# Patient Record
Sex: FEMALE | Race: WHITE | Employment: FULL TIME | URBAN - METROPOLITAN AREA
[De-identification: names, ages, dates, MRNs, and addresses within clinical notes are randomized per-mention and may not be internally consistent; named-entity substitution may affect disease eponyms.]

---

## 2019-05-13 PROBLEM — E66.01 SEVERE OBESITY (HCC): Status: ACTIVE | Noted: 2019-05-13

## 2019-07-02 ENCOUNTER — HOSPITAL ENCOUNTER (OUTPATIENT)
Dept: MAMMOGRAPHY | Age: 54
Discharge: HOME | End: 2019-07-02
Attending: NURSE PRACTITIONER

## 2019-07-02 DIAGNOSIS — Z12.39 BREAST SCREENING, UNSPECIFIED: ICD-10-CM

## 2020-12-22 ENCOUNTER — HOSPITAL ENCOUNTER (OUTPATIENT)
Dept: MAMMOGRAPHY | Age: 55
Discharge: HOME | End: 2020-12-22
Attending: NURSE PRACTITIONER
Payer: COMMERCIAL

## 2020-12-22 DIAGNOSIS — Z01.419 WELL WOMAN EXAM: ICD-10-CM

## 2020-12-22 DIAGNOSIS — Z12.31 SCREENING MAMMOGRAM, ENCOUNTER FOR: ICD-10-CM

## 2020-12-22 PROCEDURE — 77067 SCR MAMMO BI INCL CAD: CPT

## 2021-01-26 ENCOUNTER — HOSPITAL ENCOUNTER (OUTPATIENT)
Dept: CT IMAGING | Age: 56
Discharge: HOME | End: 2021-01-26
Attending: UROLOGY
Payer: COMMERCIAL

## 2021-01-26 DIAGNOSIS — R31.29 MICROHEMATURIA: ICD-10-CM

## 2021-01-26 DIAGNOSIS — R31.0 GROSS HEMATURIA: ICD-10-CM

## 2021-01-26 LAB — CREAT BLD-MCNC: 1 MG/DL (ref 0.8–1.5)

## 2021-01-26 PROCEDURE — 74178 CT ABD&PLV WO CNTR FLWD CNTR: CPT

## 2021-01-26 PROCEDURE — 82565 ASSAY OF CREATININE: CPT

## 2021-01-26 PROCEDURE — 74011000636 HC RX REV CODE- 636: Performed by: UROLOGY

## 2021-01-26 PROCEDURE — 74011000258 HC RX REV CODE- 258: Performed by: UROLOGY

## 2021-01-26 RX ORDER — SODIUM CHLORIDE 0.9 % (FLUSH) 0.9 %
10 SYRINGE (ML) INJECTION
Status: COMPLETED | OUTPATIENT
Start: 2021-01-26 | End: 2021-01-26

## 2021-01-26 RX ADMIN — SODIUM CHLORIDE 100 ML: 900 INJECTION, SOLUTION INTRAVENOUS at 14:07

## 2021-01-26 RX ADMIN — IOPAMIDOL 100 ML: 755 INJECTION, SOLUTION INTRAVENOUS at 14:07

## 2021-01-26 RX ADMIN — Medication 10 ML: at 14:07

## 2021-02-02 ENCOUNTER — HOSPITAL ENCOUNTER (OUTPATIENT)
Dept: GENERAL RADIOLOGY | Age: 56
Discharge: HOME | End: 2021-02-02
Payer: COMMERCIAL

## 2021-02-02 DIAGNOSIS — N28.89 RENAL MASS: ICD-10-CM

## 2021-02-02 PROCEDURE — 71046 X-RAY EXAM CHEST 2 VIEWS: CPT

## 2021-02-17 ENCOUNTER — HOSPITAL ENCOUNTER (OUTPATIENT)
Dept: SURGERY | Age: 56
Discharge: HOME | End: 2021-02-17
Payer: COMMERCIAL

## 2021-02-17 VITALS
RESPIRATION RATE: 17 BRPM | DIASTOLIC BLOOD PRESSURE: 97 MMHG | OXYGEN SATURATION: 98 % | SYSTOLIC BLOOD PRESSURE: 166 MMHG | BODY MASS INDEX: 34.5 KG/M2 | HEART RATE: 77 BPM | HEIGHT: 63 IN | TEMPERATURE: 97.1 F | WEIGHT: 194.7 LBS

## 2021-02-17 LAB
ANION GAP SERPL CALC-SCNC: 4 MMOL/L (ref 7–16)
APPEARANCE UR: CLEAR
BACTERIA URNS QL MICRO: 0 /HPF
BILIRUB UR QL: NEGATIVE
BUN SERPL-MCNC: 14 MG/DL (ref 6–23)
CALCIUM SERPL-MCNC: 9.1 MG/DL (ref 8.3–10.4)
CASTS URNS QL MICRO: ABNORMAL /LPF
CHLORIDE SERPL-SCNC: 105 MMOL/L (ref 98–107)
CO2 SERPL-SCNC: 30 MMOL/L (ref 21–32)
COLOR UR: YELLOW
CREAT SERPL-MCNC: 0.73 MG/DL (ref 0.6–1)
EPI CELLS #/AREA URNS HPF: ABNORMAL /HPF
ERYTHROCYTE [DISTWIDTH] IN BLOOD BY AUTOMATED COUNT: 13 % (ref 11.9–14.6)
GLUCOSE SERPL-MCNC: 102 MG/DL (ref 65–100)
GLUCOSE UR STRIP.AUTO-MCNC: NEGATIVE MG/DL
HCT VFR BLD AUTO: 49.5 % (ref 35.8–46.3)
HGB BLD-MCNC: 16.3 G/DL (ref 11.7–15.4)
HGB UR QL STRIP: ABNORMAL
KETONES UR QL STRIP.AUTO: NEGATIVE MG/DL
LEUKOCYTE ESTERASE UR QL STRIP.AUTO: ABNORMAL
MCH RBC QN AUTO: 29.1 PG (ref 26.1–32.9)
MCHC RBC AUTO-ENTMCNC: 32.9 G/DL (ref 31.4–35)
MCV RBC AUTO: 88.2 FL (ref 79.6–97.8)
NITRITE UR QL STRIP.AUTO: NEGATIVE
NRBC # BLD: 0 K/UL (ref 0–0.2)
PH UR STRIP: 6.5 [PH] (ref 5–9)
PLATELET # BLD AUTO: 216 K/UL (ref 150–450)
PMV BLD AUTO: 11.4 FL (ref 9.4–12.3)
POTASSIUM SERPL-SCNC: 4.2 MMOL/L (ref 3.5–5.1)
PROT UR STRIP-MCNC: NEGATIVE MG/DL
RBC # BLD AUTO: 5.61 M/UL (ref 4.05–5.2)
RBC #/AREA URNS HPF: ABNORMAL /HPF
SODIUM SERPL-SCNC: 139 MMOL/L (ref 136–145)
SP GR UR REFRACTOMETRY: 1.01 (ref 1–1.02)
UROBILINOGEN UR QL STRIP.AUTO: 1 EU/DL (ref 0.2–1)
WBC # BLD AUTO: 7.4 K/UL (ref 4.3–11.1)
WBC URNS QL MICRO: ABNORMAL /HPF

## 2021-02-17 PROCEDURE — 80048 BASIC METABOLIC PNL TOTAL CA: CPT

## 2021-02-17 PROCEDURE — 93005 ELECTROCARDIOGRAM TRACING: CPT | Performed by: ANESTHESIOLOGY

## 2021-02-17 PROCEDURE — 85027 COMPLETE CBC AUTOMATED: CPT

## 2021-02-17 PROCEDURE — 81001 URINALYSIS AUTO W/SCOPE: CPT

## 2021-02-17 RX ORDER — LORATADINE 10 MG/1
10 TABLET ORAL AS NEEDED
COMMUNITY

## 2021-02-17 NOTE — PERIOP NOTES
Recent Results (from the past 12 hour(s))   URINALYSIS W/ RFLX MICROSCOPIC    Collection Time: 02/17/21 10:42 AM   Result Value Ref Range    Color YELLOW      Appearance CLEAR      Specific gravity 1.015 1.001 - 1.023      pH (UA) 6.5 5.0 - 9.0      Protein Negative NEG mg/dL    Glucose Negative mg/dL    Ketone Negative NEG mg/dL    Bilirubin Negative NEG      Blood SMALL (A) NEG      Urobilinogen 1.0 0.2 - 1.0 EU/dL    Nitrites Negative NEG      Leukocyte Esterase SMALL (A) NEG      WBC 5-10 0 /hpf    RBC 3-5 0 /hpf    Epithelial cells 0-3 0 /hpf    Bacteria 0 0 /hpf    Casts 0-3 0 /lpf   CBC W/O DIFF    Collection Time: 02/17/21 10:47 AM   Result Value Ref Range    WBC 7.4 4.3 - 11.1 K/uL    RBC 5.61 (H) 4.05 - 5.2 M/uL    HGB 16.3 (H) 11.7 - 15.4 g/dL    HCT 49.5 (H) 35.8 - 46.3 %    MCV 88.2 79.6 - 97.8 FL    MCH 29.1 26.1 - 32.9 PG    MCHC 32.9 31.4 - 35.0 g/dL    RDW 13.0 11.9 - 14.6 %    PLATELET 551 283 - 150 K/uL    MPV 11.4 9.4 - 12.3 FL    ABSOLUTE NRBC 0.00 0.0 - 0.2 K/uL   METABOLIC PANEL, BASIC    Collection Time: 02/17/21 10:47 AM   Result Value Ref Range    Sodium 139 136 - 145 mmol/L    Potassium 4.2 3.5 - 5.1 mmol/L    Chloride 105 98 - 107 mmol/L    CO2 30 21 - 32 mmol/L    Anion gap 4 (L) 7 - 16 mmol/L    Glucose 102 (H) 65 - 100 mg/dL    BUN 14 6 - 23 MG/DL    Creatinine 0.73 0.6 - 1.0 MG/DL    GFR est AA >60 >60 ml/min/1.73m2    GFR est non-AA >60 >60 ml/min/1.73m2    Calcium 9.1 8.3 - 10.4 MG/DL   EKG, 12 LEAD, INITIAL    Collection Time: 02/17/21 11:51 AM   Result Value Ref Range    Ventricular Rate 78 BPM    Atrial Rate 78 BPM    P-R Interval 150 ms    QRS Duration 88 ms    Q-T Interval 362 ms    QTC Calculation (Bezet) 412 ms    Calculated P Axis 70 degrees    Calculated R Axis 83 degrees    Calculated T Axis 81 degrees    Diagnosis       Normal sinus rhythm  Normal ECG  No previous ECGs available       Labs reviewed. UA results reported to [facility] at surgeon's office.  Results routed to surgeon.

## 2021-02-17 NOTE — PERIOP NOTES
PLEASE CONTINUE TAKING ALL PRESCRIPTION MEDICATIONS UP TO THE DAY OF SURGERY UNLESS OTHERWISE DIRECTED BELOW. DISCONTINUE all vitamins and supplements 7 days prior to surgery. DISCONTINUE Non-Steriodal Anti-Inflammatory (NSAIDS) such as Advil and Aleve 5 days prior to surgery. Home Medications to take  the day of surgery   Take:   Loratadine (Claritin) if needed  Flonase if needed               Home Medications   to Hold   Meloxicam (Mobic) 5 days          Comments   1 person may accompany you into the hospital day of procedure. Visiting hours 10 am- 6 pm. 1 visitor per patient per day at this time. Please do not bring home medications with you on the day of surgery unless otherwise directed by your nurse. If you are instructed to bring home medications, please give them to your nurse as they will be administered by the nursing staff. If you have any questions, please call [address] [phone]. A copy of this note was provided to the patient for reference.

## 2021-02-17 NOTE — PERIOP NOTES
Patient confirms name and . Order to obtain consent  found in EHR and  matches case posting. Pt verbalizes understanding of 1 visitor policy. Type 3 surgery, walk-in PAT assessment complete. Labs per surgeon: CBC, BMP, UA today  T&S s/h for DOS  Labs per anesthesia protocol: EKG   EKG: completed and WDL of anesthesia protocol  Glucose: not indicated    Covid test 21. Result pending. ERAS coordinator met with patient at PAT appointment and provided education and handbook. Pre-surgery drinks with instructions provided to patient. Rx bottles visualized today. As instructed per order in EHR, called surgeon's office notify that patient does not take a beta blocker. Spoke with Mike Zhu. Antibacterial soap and Hibiclens and instructions given per hospital policy. Use in shower the night before surgery and on the morning of surgery. Patient provided with and instructed on educational handouts including Guide to Surgery, Pain Management, Hand Hygiene, Blood Transfusion Education, and [facility] Brochure. Medication instructions provided per PTA medication instruction note. Patient provided with a copy. Patient answered medical/surgical history questions at their best of ability. All prior to admission medications documented in [facility]. Patient instructed on the following:  Ar[facility] at [facility], time of arrival to be called the day before by 1700  NPO after midnight including gum, mints, and ice chips. Responsible adult must drive patient to the hospital, stay during surgery, and patient will require supervision 24 hours after anesthesia. If admitted to hospital, current visiting policy is 1 visitor per patient per day from 10 am to 6 pm.  Leave all valuables (money and jewelry) at home but bring insurance card and ID on DOS. Do not wear make-up, nail polish, lotions, cologne, perfumes, powders, or oil on skin.     Patient teach back successful and patient demonstrates knowledge of instruction.

## 2021-02-17 NOTE — PERIOP NOTES
Enhanced Recovery After Surgery: non-diabetic patients    Drink Ensure Enlive - one bottle twice daily for five days starting on 2/17/21 and stopping on 2/21/21. Do not drink any Ensure Enlive the day before surgery 2/22/21. Ensure Enlive is the preferred formula over other Ensure formulas as it is the only one that contains CaHMB which helps maintain and rebuild muscle health. It is recommended that you continue drinking this for one month after surgery. The night before surgery 2/22/21, drink 2 bottles of the Ensure Pre-Surgery drink. The morning of surgery 2/23/21, drink one bottle of the Ensure Pre-Surgery drink while on your way to the hospital. Drink this over 5-10 minutes. Drink nothing else after drinking the pre-surgical drink the morning of surgery. Bring your patient handbook with you to the hospital.      Things to remember:    1. You will be up on a chair the evening of surgery and drinking clear liquids. Your diet will be advanced by your surgeon as appropriate. 2. Beginning the day after surgery, you will be up in a chair for all meals. 3. Beginning the day after surgery, you will be out of the bed for a minimum of 6 hours (not all at one time)    4. Beginning the day after surgery, you will walk in the gusman in the gusman at least 50' at least three times a day. 5. You will be given scheduled non-narcotic pain medication to help keep your pain under control. You will have stronger pain medication ordered for break through pain. 6. All of these measures are geared toward returning your bowel to normal function as soon as possible and to prevent complications associated with bowel blockage, blood clots, and/or pneumonia.

## 2021-02-18 LAB
ATRIAL RATE: 78 BPM
CALCULATED P AXIS, ECG09: 70 DEGREES
CALCULATED R AXIS, ECG10: 83 DEGREES
CALCULATED T AXIS, ECG11: 81 DEGREES
DIAGNOSIS, 93000: NORMAL
P-R INTERVAL, ECG05: 150 MS
Q-T INTERVAL, ECG07: 362 MS
QRS DURATION, ECG06: 88 MS
QTC CALCULATION (BEZET), ECG08: 412 MS
VENTRICULAR RATE, ECG03: 78 BPM

## 2021-02-22 ENCOUNTER — ANESTHESIA EVENT (OUTPATIENT)
Dept: SURGERY | Age: 56
DRG: 658 | End: 2021-02-22
Payer: COMMERCIAL

## 2021-02-23 ENCOUNTER — ANESTHESIA (OUTPATIENT)
Dept: SURGERY | Age: 56
DRG: 658 | End: 2021-02-23
Payer: COMMERCIAL

## 2021-02-23 ENCOUNTER — HOSPITAL ENCOUNTER (INPATIENT)
Age: 56
LOS: 4 days | Discharge: HOME | DRG: 658 | End: 2021-02-27
Attending: UROLOGY | Admitting: UROLOGY
Payer: COMMERCIAL

## 2021-02-23 DIAGNOSIS — N28.89 RENAL MASS, LEFT: ICD-10-CM

## 2021-02-23 DIAGNOSIS — Z90.5 S/P NEPHRECTOMY: ICD-10-CM

## 2021-02-23 LAB
ABO + RH BLD: NORMAL
BLOOD GROUP ANTIBODIES SERPL: NORMAL
HCT VFR BLD AUTO: 46.5 % (ref 35.8–46.3)
HGB BLD-MCNC: 14.8 G/DL (ref 11.7–15.4)
SPECIMEN EXP DATE BLD: NORMAL

## 2021-02-23 PROCEDURE — 74011000258 HC RX REV CODE- 258: Performed by: UROLOGY

## 2021-02-23 PROCEDURE — 74011250636 HC RX REV CODE- 250/636: Performed by: STUDENT IN AN ORGANIZED HEALTH CARE EDUCATION/TRAINING PROGRAM

## 2021-02-23 PROCEDURE — 77030008542 HC TBNG MON PRSS EDWD -A: Performed by: STUDENT IN AN ORGANIZED HEALTH CARE EDUCATION/TRAINING PROGRAM

## 2021-02-23 PROCEDURE — 77030037088 HC TUBE ENDOTRACH ORAL NSL COVD-A: Performed by: STUDENT IN AN ORGANIZED HEALTH CARE EDUCATION/TRAINING PROGRAM

## 2021-02-23 PROCEDURE — 74011000250 HC RX REV CODE- 250: Performed by: NURSE ANESTHETIST, CERTIFIED REGISTERED

## 2021-02-23 PROCEDURE — 88307 TISSUE EXAM BY PATHOLOGIST: CPT

## 2021-02-23 PROCEDURE — 77030005401 HC CATH RAD ARRO -A: Performed by: STUDENT IN AN ORGANIZED HEALTH CARE EDUCATION/TRAINING PROGRAM

## 2021-02-23 PROCEDURE — 77030039425 HC BLD LARYNG TRULITE DISP TELE -A: Performed by: STUDENT IN AN ORGANIZED HEALTH CARE EDUCATION/TRAINING PROGRAM

## 2021-02-23 PROCEDURE — 50230 REMOVAL KIDNEY OPEN RADICAL: CPT | Performed by: UROLOGY

## 2021-02-23 PROCEDURE — 77030011264 HC ELECTRD BLD EXT COVD -A: Performed by: UROLOGY

## 2021-02-23 PROCEDURE — 0GB20ZZ EXCISION OF LEFT ADRENAL GLAND, OPEN APPROACH: ICD-10-PCS | Performed by: UROLOGY

## 2021-02-23 PROCEDURE — 85018 HEMOGLOBIN: CPT

## 2021-02-23 PROCEDURE — 76210000017 HC OR PH I REC 1.5 TO 2 HR: Performed by: UROLOGY

## 2021-02-23 PROCEDURE — 65270000029 HC RM PRIVATE

## 2021-02-23 PROCEDURE — 77030034698 HC LIGASURE MRYLND OPN SEAL DIV COVD -F: Performed by: UROLOGY

## 2021-02-23 PROCEDURE — 74011250636 HC RX REV CODE- 250/636: Performed by: NURSE ANESTHETIST, CERTIFIED REGISTERED

## 2021-02-23 PROCEDURE — 77030040830 HC CATH URETH FOL MDII -A: Performed by: UROLOGY

## 2021-02-23 PROCEDURE — 2709999900 HC NON-CHARGEABLE SUPPLY: Performed by: UROLOGY

## 2021-02-23 PROCEDURE — 77030040361 HC SLV COMPR DVT MDII -B: Performed by: UROLOGY

## 2021-02-23 PROCEDURE — 77030008462 HC STPLR SKN PROX J&J -A: Performed by: UROLOGY

## 2021-02-23 PROCEDURE — 74011250636 HC RX REV CODE- 250/636: Performed by: UROLOGY

## 2021-02-23 PROCEDURE — 74011250637 HC RX REV CODE- 250/637: Performed by: UROLOGY

## 2021-02-23 PROCEDURE — 76010000174 HC OR TIME 3.5 TO 4 HR INTENSV-TIER 1: Performed by: UROLOGY

## 2021-02-23 PROCEDURE — 0TT10ZZ RESECTION OF LEFT KIDNEY, OPEN APPROACH: ICD-10-PCS | Performed by: UROLOGY

## 2021-02-23 PROCEDURE — 77030002996 HC SUT SLK J&J -A: Performed by: UROLOGY

## 2021-02-23 PROCEDURE — 74011250637 HC RX REV CODE- 250/637: Performed by: STUDENT IN AN ORGANIZED HEALTH CARE EDUCATION/TRAINING PROGRAM

## 2021-02-23 PROCEDURE — 76060000038 HC ANESTHESIA 3.5 TO 4 HR: Performed by: UROLOGY

## 2021-02-23 PROCEDURE — 86901 BLOOD TYPING SEROLOGIC RH(D): CPT

## 2021-02-23 PROCEDURE — 77030040922 HC BLNKT HYPOTHRM STRY -A: Performed by: STUDENT IN AN ORGANIZED HEALTH CARE EDUCATION/TRAINING PROGRAM

## 2021-02-23 PROCEDURE — 2709999900 HC NON-CHARGEABLE SUPPLY

## 2021-02-23 PROCEDURE — 77030002966 HC SUT PDS J&J -A: Performed by: UROLOGY

## 2021-02-23 PROCEDURE — 77030027138 HC INCENT SPIROMETER -A

## 2021-02-23 PROCEDURE — 77030013794 HC KT TRNSDUC BLD EDWD -B: Performed by: STUDENT IN AN ORGANIZED HEALTH CARE EDUCATION/TRAINING PROGRAM

## 2021-02-23 PROCEDURE — 77030019908 HC STETH ESOPH SIMS -A: Performed by: STUDENT IN AN ORGANIZED HEALTH CARE EDUCATION/TRAINING PROGRAM

## 2021-02-23 RX ORDER — CELECOXIB 100 MG/1
100 CAPSULE ORAL 2 TIMES DAILY
Status: DISCONTINUED | OUTPATIENT
Start: 2021-02-24 | End: 2021-02-23

## 2021-02-23 RX ORDER — ROCURONIUM BROMIDE 10 MG/ML
INJECTION, SOLUTION INTRAVENOUS AS NEEDED
Status: DISCONTINUED | OUTPATIENT
Start: 2021-02-23 | End: 2021-02-23 | Stop reason: HOSPADM

## 2021-02-23 RX ORDER — ONDANSETRON 8 MG/1
4 TABLET, ORALLY DISINTEGRATING ORAL
Status: DISCONTINUED | OUTPATIENT
Start: 2021-02-23 | End: 2021-02-27 | Stop reason: HOSPADM

## 2021-02-23 RX ORDER — HYDROMORPHONE HYDROCHLORIDE 2 MG/ML
0.5 INJECTION, SOLUTION INTRAMUSCULAR; INTRAVENOUS; SUBCUTANEOUS
Status: COMPLETED | OUTPATIENT
Start: 2021-02-23 | End: 2021-02-23

## 2021-02-23 RX ORDER — DEXAMETHASONE SODIUM PHOSPHATE 4 MG/ML
INJECTION, SOLUTION INTRA-ARTICULAR; INTRALESIONAL; INTRAMUSCULAR; INTRAVENOUS; SOFT TISSUE AS NEEDED
Status: DISCONTINUED | OUTPATIENT
Start: 2021-02-23 | End: 2021-02-23 | Stop reason: HOSPADM

## 2021-02-23 RX ORDER — LIDOCAINE HYDROCHLORIDE 20 MG/ML
INJECTION, SOLUTION EPIDURAL; INFILTRATION; INTRACAUDAL; PERINEURAL AS NEEDED
Status: DISCONTINUED | OUTPATIENT
Start: 2021-02-23 | End: 2021-02-23 | Stop reason: HOSPADM

## 2021-02-23 RX ORDER — FLUMAZENIL 0.1 MG/ML
0.2 INJECTION INTRAVENOUS
Status: DISCONTINUED | OUTPATIENT
Start: 2021-02-23 | End: 2021-02-23 | Stop reason: HOSPADM

## 2021-02-23 RX ORDER — NALOXONE HYDROCHLORIDE 0.4 MG/ML
0.1 INJECTION, SOLUTION INTRAMUSCULAR; INTRAVENOUS; SUBCUTANEOUS AS NEEDED
Status: DISCONTINUED | OUTPATIENT
Start: 2021-02-23 | End: 2021-02-23 | Stop reason: HOSPADM

## 2021-02-23 RX ORDER — SODIUM CHLORIDE, SODIUM LACTATE, POTASSIUM CHLORIDE, CALCIUM CHLORIDE 600; 310; 30; 20 MG/100ML; MG/100ML; MG/100ML; MG/100ML
100 INJECTION, SOLUTION INTRAVENOUS CONTINUOUS
Status: DISCONTINUED | OUTPATIENT
Start: 2021-02-23 | End: 2021-02-23 | Stop reason: HOSPADM

## 2021-02-23 RX ORDER — ACETAMINOPHEN 500 MG
1000 TABLET ORAL ONCE
Status: DISCONTINUED | OUTPATIENT
Start: 2021-02-23 | End: 2021-02-23 | Stop reason: HOSPADM

## 2021-02-23 RX ORDER — SODIUM CHLORIDE 0.9 % (FLUSH) 0.9 %
5-40 SYRINGE (ML) INJECTION EVERY 8 HOURS
Status: DISCONTINUED | OUTPATIENT
Start: 2021-02-23 | End: 2021-02-23 | Stop reason: HOSPADM

## 2021-02-23 RX ORDER — SODIUM CHLORIDE, SODIUM LACTATE, POTASSIUM CHLORIDE, CALCIUM CHLORIDE 600; 310; 30; 20 MG/100ML; MG/100ML; MG/100ML; MG/100ML
25 INJECTION, SOLUTION INTRAVENOUS CONTINUOUS
Status: DISCONTINUED | OUTPATIENT
Start: 2021-02-23 | End: 2021-02-26

## 2021-02-23 RX ORDER — DIPHENHYDRAMINE HYDROCHLORIDE 50 MG/ML
12.5 INJECTION, SOLUTION INTRAMUSCULAR; INTRAVENOUS
Status: DISCONTINUED | OUTPATIENT
Start: 2021-02-23 | End: 2021-02-23 | Stop reason: HOSPADM

## 2021-02-23 RX ORDER — APREPITANT 40 MG/1
40 CAPSULE ORAL ONCE
Status: COMPLETED | OUTPATIENT
Start: 2021-02-23 | End: 2021-02-23

## 2021-02-23 RX ORDER — KETAMINE HYDROCHLORIDE 50 MG/ML
INJECTION, SOLUTION INTRAMUSCULAR; INTRAVENOUS AS NEEDED
Status: DISCONTINUED | OUTPATIENT
Start: 2021-02-23 | End: 2021-02-23 | Stop reason: HOSPADM

## 2021-02-23 RX ORDER — SODIUM CHLORIDE, SODIUM LACTATE, POTASSIUM CHLORIDE, CALCIUM CHLORIDE 600; 310; 30; 20 MG/100ML; MG/100ML; MG/100ML; MG/100ML
INJECTION, SOLUTION INTRAVENOUS
Status: DISCONTINUED | OUTPATIENT
Start: 2021-02-23 | End: 2021-02-23 | Stop reason: HOSPADM

## 2021-02-23 RX ORDER — HYDROMORPHONE HYDROCHLORIDE 1 MG/ML
1 INJECTION, SOLUTION INTRAMUSCULAR; INTRAVENOUS; SUBCUTANEOUS
Status: DISCONTINUED | OUTPATIENT
Start: 2021-02-23 | End: 2021-02-25

## 2021-02-23 RX ORDER — GABAPENTIN 300 MG/1
300 CAPSULE ORAL 3 TIMES DAILY
Status: DISCONTINUED | OUTPATIENT
Start: 2021-02-23 | End: 2021-02-27 | Stop reason: HOSPADM

## 2021-02-23 RX ORDER — NALOXONE HYDROCHLORIDE 0.4 MG/ML
0.4 INJECTION, SOLUTION INTRAMUSCULAR; INTRAVENOUS; SUBCUTANEOUS AS NEEDED
Status: DISCONTINUED | OUTPATIENT
Start: 2021-02-23 | End: 2021-02-27 | Stop reason: HOSPADM

## 2021-02-23 RX ORDER — LABETALOL HYDROCHLORIDE 5 MG/ML
INJECTION, SOLUTION INTRAVENOUS AS NEEDED
Status: DISCONTINUED | OUTPATIENT
Start: 2021-02-23 | End: 2021-02-23 | Stop reason: HOSPADM

## 2021-02-23 RX ORDER — EPHEDRINE SULFATE/0.9% NACL/PF 50 MG/5 ML
SYRINGE (ML) INTRAVENOUS AS NEEDED
Status: DISCONTINUED | OUTPATIENT
Start: 2021-02-23 | End: 2021-02-23 | Stop reason: HOSPADM

## 2021-02-23 RX ORDER — ACETAMINOPHEN 500 MG
1000 TABLET ORAL EVERY 6 HOURS
Status: DISCONTINUED | OUTPATIENT
Start: 2021-02-23 | End: 2021-02-27 | Stop reason: HOSPADM

## 2021-02-23 RX ORDER — ESMOLOL HYDROCHLORIDE 10 MG/ML
INJECTION INTRAVENOUS AS NEEDED
Status: DISCONTINUED | OUTPATIENT
Start: 2021-02-23 | End: 2021-02-23 | Stop reason: HOSPADM

## 2021-02-23 RX ORDER — CEFAZOLIN SODIUM/WATER 2 G/20 ML
2 SYRINGE (ML) INTRAVENOUS ONCE
Status: COMPLETED | OUTPATIENT
Start: 2021-02-23 | End: 2021-02-23

## 2021-02-23 RX ORDER — FENTANYL CITRATE 50 UG/ML
INJECTION, SOLUTION INTRAMUSCULAR; INTRAVENOUS AS NEEDED
Status: DISCONTINUED | OUTPATIENT
Start: 2021-02-23 | End: 2021-02-23 | Stop reason: HOSPADM

## 2021-02-23 RX ORDER — SODIUM CHLORIDE 0.9 % (FLUSH) 0.9 %
5-40 SYRINGE (ML) INJECTION AS NEEDED
Status: DISCONTINUED | OUTPATIENT
Start: 2021-02-23 | End: 2021-02-23 | Stop reason: HOSPADM

## 2021-02-23 RX ORDER — LIDOCAINE HYDROCHLORIDE ANHYDROUS AND DEXTROSE MONOHYDRATE .4; 5 G/100ML; G/100ML
1 INJECTION, SOLUTION INTRAVENOUS CONTINUOUS
Status: ACTIVE | OUTPATIENT
Start: 2021-02-23 | End: 2021-02-24

## 2021-02-23 RX ORDER — VECURONIUM BROMIDE FOR INJECTION 1 MG/ML
INJECTION, POWDER, LYOPHILIZED, FOR SOLUTION INTRAVENOUS AS NEEDED
Status: DISCONTINUED | OUTPATIENT
Start: 2021-02-23 | End: 2021-02-23 | Stop reason: HOSPADM

## 2021-02-23 RX ORDER — DIPHENHYDRAMINE HCL 25 MG
25 CAPSULE ORAL
Status: DISCONTINUED | OUTPATIENT
Start: 2021-02-23 | End: 2021-02-27 | Stop reason: HOSPADM

## 2021-02-23 RX ORDER — MIDAZOLAM HYDROCHLORIDE 1 MG/ML
2 INJECTION, SOLUTION INTRAMUSCULAR; INTRAVENOUS
Status: COMPLETED | OUTPATIENT
Start: 2021-02-23 | End: 2021-02-23

## 2021-02-23 RX ORDER — ESTERIFIED ESTROGEN AND METHYLTESTOSTERONE 1.25; 2.5 MG/1; MG/1
2 TABLET ORAL DAILY
Status: DISCONTINUED | OUTPATIENT
Start: 2021-02-24 | End: 2021-02-27 | Stop reason: HOSPADM

## 2021-02-23 RX ORDER — BUPROPION HYDROCHLORIDE 150 MG/1
150 TABLET, EXTENDED RELEASE ORAL DAILY
Status: DISCONTINUED | OUTPATIENT
Start: 2021-02-24 | End: 2021-02-27 | Stop reason: HOSPADM

## 2021-02-23 RX ORDER — OXYCODONE HYDROCHLORIDE 5 MG/1
5 TABLET ORAL
Status: DISCONTINUED | OUTPATIENT
Start: 2021-02-23 | End: 2021-02-23 | Stop reason: HOSPADM

## 2021-02-23 RX ORDER — GABAPENTIN 300 MG/1
300 CAPSULE ORAL ONCE
Status: COMPLETED | OUTPATIENT
Start: 2021-02-23 | End: 2021-02-23

## 2021-02-23 RX ORDER — CELECOXIB 100 MG/1
100 CAPSULE ORAL 2 TIMES DAILY
Status: DISCONTINUED | OUTPATIENT
Start: 2021-02-24 | End: 2021-02-24

## 2021-02-23 RX ORDER — OXYCODONE HYDROCHLORIDE 5 MG/1
5 TABLET ORAL
Status: DISCONTINUED | OUTPATIENT
Start: 2021-02-23 | End: 2021-02-27 | Stop reason: HOSPADM

## 2021-02-23 RX ORDER — ONDANSETRON 2 MG/ML
INJECTION INTRAMUSCULAR; INTRAVENOUS AS NEEDED
Status: DISCONTINUED | OUTPATIENT
Start: 2021-02-23 | End: 2021-02-23 | Stop reason: HOSPADM

## 2021-02-23 RX ORDER — LIDOCAINE HYDROCHLORIDE 10 MG/ML
0.1 INJECTION INFILTRATION; PERINEURAL AS NEEDED
Status: DISCONTINUED | OUTPATIENT
Start: 2021-02-23 | End: 2021-02-23 | Stop reason: HOSPADM

## 2021-02-23 RX ORDER — KETOROLAC TROMETHAMINE 30 MG/ML
15 INJECTION, SOLUTION INTRAMUSCULAR; INTRAVENOUS EVERY 6 HOURS
Status: DISCONTINUED | OUTPATIENT
Start: 2021-02-23 | End: 2021-02-24

## 2021-02-23 RX ORDER — LISINOPRIL 5 MG/1
10 TABLET ORAL
Status: DISCONTINUED | OUTPATIENT
Start: 2021-02-23 | End: 2021-02-27 | Stop reason: HOSPADM

## 2021-02-23 RX ADMIN — HYDROMORPHONE HYDROCHLORIDE 0.5 MG: 2 INJECTION INTRAMUSCULAR; INTRAVENOUS; SUBCUTANEOUS at 14:15

## 2021-02-23 RX ADMIN — HYDROMORPHONE HYDROCHLORIDE 0.5 MG: 2 INJECTION INTRAMUSCULAR; INTRAVENOUS; SUBCUTANEOUS at 14:45

## 2021-02-23 RX ADMIN — SODIUM CHLORIDE, SODIUM LACTATE, POTASSIUM CHLORIDE, AND CALCIUM CHLORIDE: 600; 310; 30; 20 INJECTION, SOLUTION INTRAVENOUS at 11:28

## 2021-02-23 RX ADMIN — CEFAZOLIN 2 G: 1 INJECTION, POWDER, FOR SOLUTION INTRAVENOUS at 10:57

## 2021-02-23 RX ADMIN — ROCURONIUM BROMIDE 50 MG: 10 INJECTION, SOLUTION INTRAVENOUS at 10:13

## 2021-02-23 RX ADMIN — ACETAMINOPHEN 1000 MG: 500 TABLET ORAL at 17:16

## 2021-02-23 RX ADMIN — ESMOLOL HYDROCHLORIDE 30 MG: 10 INJECTION, SOLUTION INTRAVENOUS at 13:35

## 2021-02-23 RX ADMIN — GABAPENTIN 300 MG: 300 CAPSULE ORAL at 17:17

## 2021-02-23 RX ADMIN — FENTANYL CITRATE 50 MCG: 50 INJECTION INTRAMUSCULAR; INTRAVENOUS at 12:45

## 2021-02-23 RX ADMIN — NALOXEGOL OXALATE 12.5 MG: 12.5 TABLET, FILM COATED ORAL at 10:00

## 2021-02-23 RX ADMIN — APREPITANT 40 MG: 40 CAPSULE ORAL at 08:11

## 2021-02-23 RX ADMIN — HYDROMORPHONE HYDROCHLORIDE 0.5 MG: 2 INJECTION INTRAMUSCULAR; INTRAVENOUS; SUBCUTANEOUS at 14:25

## 2021-02-23 RX ADMIN — OXYCODONE 5 MG: 5 TABLET ORAL at 21:54

## 2021-02-23 RX ADMIN — Medication 10 MG: at 10:59

## 2021-02-23 RX ADMIN — PHENYLEPHRINE HYDROCHLORIDE 150 MCG: 10 INJECTION INTRAVENOUS at 10:22

## 2021-02-23 RX ADMIN — PHENYLEPHRINE HYDROCHLORIDE 200 MCG: 10 INJECTION INTRAVENOUS at 10:40

## 2021-02-23 RX ADMIN — GABAPENTIN 300 MG: 300 CAPSULE ORAL at 08:10

## 2021-02-23 RX ADMIN — VECURONIUM BROMIDE 1 MG: 1 INJECTION, POWDER, LYOPHILIZED, FOR SOLUTION INTRAVENOUS at 12:50

## 2021-02-23 RX ADMIN — LABETALOL HYDROCHLORIDE 5 MG: 5 INJECTION INTRAVENOUS at 12:59

## 2021-02-23 RX ADMIN — GABAPENTIN 300 MG: 300 CAPSULE ORAL at 21:47

## 2021-02-23 RX ADMIN — SODIUM CHLORIDE, SODIUM LACTATE, POTASSIUM CHLORIDE, AND CALCIUM CHLORIDE 100 ML/HR: 600; 310; 30; 20 INJECTION, SOLUTION INTRAVENOUS at 08:10

## 2021-02-23 RX ADMIN — LIDOCAINE HYDROCHLORIDE 1 MG/KG/HR: 4 INJECTION, SOLUTION INTRAVENOUS at 10:20

## 2021-02-23 RX ADMIN — VECURONIUM BROMIDE 2 MG: 1 INJECTION, POWDER, LYOPHILIZED, FOR SOLUTION INTRAVENOUS at 11:48

## 2021-02-23 RX ADMIN — CEFAZOLIN 1 G: 1 INJECTION, POWDER, FOR SOLUTION INTRAMUSCULAR; INTRAVENOUS at 18:40

## 2021-02-23 RX ADMIN — FENTANYL CITRATE 50 MCG: 50 INJECTION INTRAMUSCULAR; INTRAVENOUS at 11:17

## 2021-02-23 RX ADMIN — KETAMINE HYDROCHLORIDE 30 MG: 50 INJECTION INTRAMUSCULAR; INTRAVENOUS at 10:51

## 2021-02-23 RX ADMIN — PHENYLEPHRINE HYDROCHLORIDE 100 MCG: 10 INJECTION INTRAVENOUS at 10:59

## 2021-02-23 RX ADMIN — SODIUM CHLORIDE, SODIUM LACTATE, POTASSIUM CHLORIDE, AND CALCIUM CHLORIDE: 600; 310; 30; 20 INJECTION, SOLUTION INTRAVENOUS at 10:07

## 2021-02-23 RX ADMIN — MIDAZOLAM 2 MG: 1 INJECTION INTRAMUSCULAR; INTRAVENOUS at 09:27

## 2021-02-23 RX ADMIN — ONDANSETRON 4 MG: 2 INJECTION INTRAMUSCULAR; INTRAVENOUS at 13:34

## 2021-02-23 RX ADMIN — SODIUM CHLORIDE, SODIUM LACTATE, POTASSIUM CHLORIDE, AND CALCIUM CHLORIDE: 600; 310; 30; 20 INJECTION, SOLUTION INTRAVENOUS at 10:18

## 2021-02-23 RX ADMIN — FENTANYL CITRATE 50 MCG: 50 INJECTION INTRAMUSCULAR; INTRAVENOUS at 11:24

## 2021-02-23 RX ADMIN — LISINOPRIL 10 MG: 5 TABLET ORAL at 21:47

## 2021-02-23 RX ADMIN — KETAMINE HYDROCHLORIDE 15 MG: 50 INJECTION INTRAMUSCULAR; INTRAVENOUS at 11:31

## 2021-02-23 RX ADMIN — PHENYLEPHRINE HYDROCHLORIDE 100 MCG: 10 INJECTION INTRAVENOUS at 10:32

## 2021-02-23 RX ADMIN — KETOROLAC TROMETHAMINE 15 MG: 30 INJECTION, SOLUTION INTRAMUSCULAR at 23:54

## 2021-02-23 RX ADMIN — VECURONIUM BROMIDE 3 MG: 1 INJECTION, POWDER, LYOPHILIZED, FOR SOLUTION INTRAVENOUS at 11:10

## 2021-02-23 RX ADMIN — FENTANYL CITRATE 100 MCG: 50 INJECTION INTRAMUSCULAR; INTRAVENOUS at 10:13

## 2021-02-23 RX ADMIN — KETAMINE HYDROCHLORIDE 15 MG: 50 INJECTION INTRAMUSCULAR; INTRAVENOUS at 12:35

## 2021-02-23 RX ADMIN — VECURONIUM BROMIDE 2 MG: 1 INJECTION, POWDER, LYOPHILIZED, FOR SOLUTION INTRAVENOUS at 12:24

## 2021-02-23 RX ADMIN — FENTANYL CITRATE 50 MCG: 50 INJECTION INTRAMUSCULAR; INTRAVENOUS at 11:41

## 2021-02-23 RX ADMIN — KETOROLAC TROMETHAMINE 15 MG: 30 INJECTION, SOLUTION INTRAMUSCULAR at 17:17

## 2021-02-23 RX ADMIN — ACETAMINOPHEN 1000 MG: 500 TABLET ORAL at 23:54

## 2021-02-23 RX ADMIN — DEXAMETHASONE SODIUM PHOSPHATE 10 MG: 4 INJECTION, SOLUTION INTRAMUSCULAR; INTRAVENOUS at 13:34

## 2021-02-23 RX ADMIN — HYDROMORPHONE HYDROCHLORIDE 0.5 MG: 2 INJECTION INTRAMUSCULAR; INTRAVENOUS; SUBCUTANEOUS at 14:35

## 2021-02-23 RX ADMIN — SODIUM CHLORIDE, SODIUM LACTATE, POTASSIUM CHLORIDE, AND CALCIUM CHLORIDE 25 ML/HR: 600; 310; 30; 20 INJECTION, SOLUTION INTRAVENOUS at 17:17

## 2021-02-23 RX ADMIN — LIDOCAINE HYDROCHLORIDE 60 MG: 20 INJECTION, SOLUTION EPIDURAL; INFILTRATION; INTRACAUDAL; PERINEURAL at 10:13

## 2021-02-23 RX ADMIN — PHENYLEPHRINE HYDROCHLORIDE 150 MCG: 10 INJECTION INTRAVENOUS at 10:37

## 2021-02-23 NOTE — PERIOP NOTES
Lidocaine drip pump settings confirmed at [facility] body weight: 52.4 kg (115 lb 8.3 oz). Infusing at 13.1 ml/hour.

## 2021-02-23 NOTE — H&P
Catie Oates   : 1965        Chief Complaint   Patient presents with    Other     Left renal mass. KYM Ocasio is a 54 y.o. female Referred by Efren Bell NP at [facility]. for evaluation and treatment of gross hematuria. UA in  showed tr blood, 1+ uriel by dipstick. Negative urine culture   Pt has noted brownish spotting in her underwear. Usually notes after being active. She had hysterectomy and urethral sling in the past, done in [place]. Has occasional dysuria. No incontinence, does have some urgency. She is a smoker. Cr 0.82 in . ordered a CT but pt states that nobody ever called her. She continues to have intermittent gross hematuria and pain in her mid back. Here for cysto. Today she tells me that she had CTA at [facility] on 20: 1. Normal thoracoabdominal aorta. Specifically, no acute aortic syndrome     2. 9.4 cm complex cystic lesion left kidney  Differential diagnosis  Multilocular cystic nephroma (most likely given the radiographic appearance)  Bosniak class III/class IV cystic lesion. No evidence of local or distant malignancy. 3. Sequela of remote granulomatous exposure   Negative cysto 21. CT A/P w/wo 21: IMPRESSION   1. Mixed cystic and solid mass arising from the left superior renal pole   measuring 10.3 x 8.7 x 7.3 cm, consistent with renal cell carcinoma. 2. No definitive findings of metastatic disease. 3. Additional simple cysts involving both kidneys with a 9 mm exophytic left   inferior pole lesion, too small to definitively characterize. CXR 21 negative for mets. She is having intermittent dull left flank pain and gross hematuria. She has hx of Chiari malformation, brain surgery by Aidan Zafar at [facility]. in  and .         Past Medical History:   Diagnosis Date    Arthritis     Hypertension            Past Surgical History:   Procedure Laterality Date    HX BLADDER SUSPENSION      TVT for Stress Incontinence    HX OTHER SURGICAL      Subtemporal decompression 01/2011    HX OTHER SURGICAL      Subtemporal decompression with mesh 12/2011    HX TOTAL LAPAROSCOPIC HYSTERECTOMY W/ BS&O              Current Outpatient Medications   Medication Sig Dispense Refill    meloxicam (MOBIC) 15 mg tablet Take 15 mg by mouth daily.  buPROPion XL (WELLBUTRIN XL) 150 mg tablet Take 1 Tab by mouth every morning. 30 Tab 5    estrogens, conjugated,-methylTESTOSTERone (ESTRATEST) 1.25-2.5 mg per tablet Take 2 Tabs by mouth daily. Max Daily Amount: 2 Tabs. 60 Tab 5    lisinopril (PRINIVIL, ZESTRIL) 10 mg tablet Take by mouth daily.       ibuprofen (ADVIL) 200 mg tablet Take by mouth. prn             Allergies   Allergen Reactions    Codeine Nausea and Vomiting    Sulfa (Sulfonamide Antibiotics) Unknown (comments)     SOB and Headaches     Social History           Socioeconomic History    Marital status:      Spouse name: Not on file    Number of children: Not on file    Years of education: Not on file    Highest education level: Not on file   Occupational History    Not on file   Social Needs    Financial resource strain: Not on file    Food insecurity     Worry: Not on file     Inability: Not on file    Transportation needs     Medical: Not on file     Non-medical: Not on file   Tobacco Use    Smoking status: Current Some Day Smoker     Packs/day: 0.50    Smokeless tobacco: Never Used    Tobacco comment: Smoking 4-5 cig per week   Substance and Sexual Activity    Alcohol use: Yes     Comment: occ    Drug use: Never    Sexual activity: Yes     Partners: Male     Birth control/protection: Surgical   Lifestyle    Physical activity     Days per week: Not on file     Minutes per session: Not on file    Stress: Not on file   Relationships    Social connections     Talks on phone: Not on file     Gets together: Not on file     Attends [employer] service: Not on file     Active member of club or organization: Not on file     Attends meetings of clubs or organizations: Not on file     Relationship status: Not on file    Intimate partner violence     Fear of current or ex partner: Not on file     Emotionally abused: Not on file     Physically abused: Not on file     Forced sexual activity: Not on file   Other Topics Concern    Not on file   Social History Narrative    Not on file           Family History   Adopted: Yes   Problem Relation Age of Onset    Ovarian Cancer Mother     Biological Mother    Uterine Cancer Mother     Cancer Mother     Bone Ca    Arthritis-rheumatoid Father     Biological Father    Dementia Maternal Grandmother     Diabetes Maternal Grandmother     Heart Disease Maternal Grandmother     Hypertension Maternal Grandmother     Cancer Maternal Grandfather     No Known Problems Paternal Grandmother     No Known Problems Paternal Grandfather     Diabetes Maternal Aunt     Heart Disease Maternal Aunt     Heart Surgery Maternal Aunt      ROS   Visit Vitals   Temp 98 °F (36.7 °C) (Temporal)     Physical Exam   General   Mental Status - Patient is alert and oriented X3. Build & Nutrition - Well nourished. Chest and Lung Exam   Chest and lung exam reveals - normal excursion with symmetric chest walls, quiet, even and easy respiratory effort with no use of accessory muscles and on auscultation, normal breath sounds, no adventitious sounds and normal vocal resonance. Cardiovascular   Cardiovascular examination reveals - normal heart sounds, regular rate and rhythm with no murmurs. Abdomen   Palpation/Percussion: Palpation and Percussion of the abdomen reveal - Non Tender, No Rebound tenderness, No Rigidity (guarding), No hepatosplenomegaly, No Palpable abdominal masses and Soft. Hernia - Bilateral - No Hernia(s) present. Assessment and Plan     ICD-10-CM ICD-9-CM    1. Left renal mass  N28.89 593.9    2.  Renal mass  N28.89 593.9    here for discussion of new finding of left renal mass. Reviewed CT with pt and . Left renal mass takes up >2/3 of right kidney and extends into hilum. Not appropriate for partial nephrectomy. Discussed small chance of this being benign. Risks of bleeding, infection, renal failure , bowel injury, cardiovascular complications or death discussed. Plan left radical nephrectomy, open, ERAS protocol   No orders of the defined types were placed in this encounter.

## 2021-02-23 NOTE — PERIOP NOTES
TRANSFER - OUT REPORT:    Verbal report given to [facility] on Brittanie Everett  being transferred to [facility] for routine post - op       Report consisted of patients Situation, Background, Assessment and   Recommendations(SBAR). Information from the following report(s) SBAR, OR Summary and Cardiac Rhythm nsr was reviewed with the receiving nurse. Lines:   Peripheral IV 02/23/21 Right Hand (Active)   Site Assessment Clean, dry, & intact 02/23/21 1359   Phlebitis Assessment 0 02/23/21 1359   Infiltration Assessment 0 02/23/21 1359   Dressing Status Clean, dry, & intact 02/23/21 1359   Dressing Type Tape;Transparent 02/23/21 1359   Hub Color/Line Status Patent 02/23/21 1359       Peripheral IV 02/23/21 Left Hand (Active)   Site Assessment Clean, dry, & intact 02/23/21 1359   Phlebitis Assessment 0 02/23/21 1359   Infiltration Assessment 0 02/23/21 1359   Dressing Status Clean, dry, & intact 02/23/21 1359   Dressing Type Tape;Transparent 02/23/21 1359   Hub Color/Line Status Patent 02/23/21 1359       Arterial Line 02/23/21 Left Radial artery (Active)   Site Assessment Clean, dry, & intact 02/23/21 1359   Dressing Status Clean, dry, & intact 02/23/21 1359   Dressing Type Tape;Transparent 02/23/21 1359   Line Status Intact and in place 02/23/21 1359   Treatment Arm board on;Zeroed or re-zeroed 02/23/21 1359        Opportunity for questions and clarification was provided. Patient transported with:   O2 @ 4 liters    VTE prophylaxis orders have been written for Nery Oates. Patient and family given floor number and nurses name. Family updated re: pt status after security code verified.

## 2021-02-23 NOTE — ANESTHESIA POSTPROCEDURE EVALUATION
Procedure(s):  ERAS/ LEFT NEPHRECTOMY RADICAL WITH REMOVAL OF PARTIAL ADRENAL GLAND. general    Anesthesia Post Evaluation      Multimodal analgesia: multimodal analgesia used between 6 hours prior to anesthesia start to PACU discharge  Patient location during evaluation: bedside  Patient participation: complete - patient participated  Level of consciousness: awake and alert  Pain management: adequate  Airway patency: patent  Anesthetic complications: no  Cardiovascular status: acceptable  Respiratory status: acceptable  Hydration status: acceptable  Post anesthesia nausea and vomiting:  controlled  Final Post Anesthesia Temperature Assessment:  Normothermia (36.0-37.5 degrees C)      INITIAL Post-op Vital signs:   Vitals Value Taken Time   /73 02/23/21 1558   Temp 37.3 °C (99.2 °F) 02/23/21 1512   Pulse 105 02/23/21 1602   Resp 14 02/23/21 1517   SpO2 93 % 02/23/21 1602   Vitals shown include unvalidated device data.

## 2021-02-23 NOTE — ANESTHESIA PROCEDURE NOTES
Arterial Line Placement    Start time: 2/23/2021 10:25 AM  End time: 2/23/2021 10:40 AM  Performed by: Ximena Woods MD  Authorized by: Ximena Woods MD     Pre-Procedure  Indications:  Arterial pressure monitoring and blood sampling  Preanesthetic Checklist: patient identified, risks and benefits discussed, anesthesia consent, site marked, patient being monitored, timeout performed and patient being monitored    Timeout Time: 10:25        Procedure:   Prep:  Chlorhexidine  Seldinger Technique?: Yes    Orientation:  Left  Location:  Radial artery  Catheter size:  20 G  Number of attempts:  3 or more    Assessment:   Post-procedure:  Line secured and sterile dressing applied  Patient Tolerance:  Patient tolerated the procedure well with no immediate complications  Comment:   2 attempts on right by MD. 1 attempt by CRNA on left. Ultrasound guidance used with success on left first attempt by MD    Potential vascular access sites examined with ultrasound and an acceptable, patent site selected as noted above. Needle path and vascular access visualized in real time using ultrasound.

## 2021-02-23 NOTE — ANESTHESIA PREPROCEDURE EVALUATION
Relevant Problems   No relevant active problems       Anesthetic History   No history of anesthetic complications            Review of Systems / Medical History  Patient summary reviewed, nursing notes reviewed and pertinent labs reviewed    Pulmonary          Smoker         Neuro/Psych   Within defined limits           Cardiovascular    Hypertension: well controlled              Exercise tolerance: >4 METS     GI/Hepatic/Renal     GERD: well controlled           Endo/Other        Obesity, arthritis and cancer (likely RCC)     Other Findings              Physical Exam    Airway  Mallampati: II  TM Distance: 4 - 6 cm  Neck ROM: normal range of motion   Mouth opening: Normal     Cardiovascular  Regular rate and rhythm,  S1 and S2 normal,  no murmur, click, rub, or gallop             Dental  No notable dental hx       Pulmonary  Breath sounds clear to auscultation               Abdominal  GI exam deferred       Other Findings            Anesthetic Plan    ASA: 3  Anesthesia type: general    Monitoring Plan: Arterial line      Induction: Intravenous  Anesthetic plan and risks discussed with: Patient and Family

## 2021-02-23 NOTE — OP NOTES
[facility]  OPERATIVE REPORT    Name:  Eric Mejía  MR#:  [id number]  :  1965  ACCOUNT #:  [de-identified]  DATE OF SERVICE:  2021    PREOPERATIVE DIAGNOSIS:  Left kidney mass. POSTOPERATIVE DIAGNOSIS:  Left kidney mass. PROCEDURE PERFORMED:  Left radical open nephrectomy with partial removal of adrenal gland on the left. SURGEON:  Venancio Read. Alisson Jennings MD    ASSISTANT:  Poonam Beltran MD    ANESTHESIA:  General.    COMPLICATIONS:  None. SPECIMENS REMOVED:  Left kidney and partial left adrenal gland. IMPLANTS:  None. ESTIMATED BLOOD LOSS:  200 mL. DRAINS:  Brown catheter. FINDINGS:  See op note. INDICATIONS FOR OPERATIVE PROCEDURE:  The patient is a 20-year-old female with a history of large 10-cm superior pole mass abutting the pancreas, spleen and adrenal gland of her left kidney. She was counseled on management options and opted to proceed with a left open radical nephrectomy today. DESCRIPTION OF OPERATIVE PROCEDURE:  After informed consent was obtained, the correct patient was identified in the preoperative holding area. She was taken back to the operating room suite and placed on the table in the supine position. Time-out was performed confirming the correct patient and the planned procedure. She received 2 g of IV Ancef prior to the smooth induction of general endotracheal anesthesia. She was left in the supine position and a bump was placed over the kidney rest on to the patient's left flank to elevate it. Brown catheter was placed using sterile technique and then she was prepped and draped in the usual sterile fashion after care was taken to pad all pressure points. I began the case by making an incision two fingerbreadths below the left costal margin from the xiphoid process out laterally. The incision was approximately 15-20 cm in length.   I performed as the assistant throughout the case to Dr. Sissy Nick and I was present and scrubbed for the entirety of the procedure. I assisted Dr. Sissy Nick with the opening of the patient's skin dissection down to the patient's left kidney. I assisted him with visualization as well as with mobilization of the patient's left kidney from its lateral, posterior and medial attachments. I assisted him with identification and dissection of the renal hilum and I assisted him with tying off of the renal artery and renal vein. I performed the ties of the left artery and left renal vein on the distal aspect and he performed the ties of the left artery and renal vein on the proximal aspect. I then assisted with suction and assisted him with identification of the spleen and pancreas and I assisted him with mobilization of the superior pole of the kidney off of the pancreas and spleen respectively. I then assisted him with the closure of the wound. Please see Dr. Marleny Mcelroy operative note for further details regarding this procedure.         Mian Kang MD      [id number]  D:  02/23/2021 13:49  T:  02/23/2021 14:51  JOB #:  [id number]

## 2021-02-23 NOTE — PROGRESS NOTES
02/23/21 1631   Dual Skin Pressure Injury Assessment   Dual Skin Pressure Injury Assessment WDL   Second Care Provider (Based on [address] [place] [address]) Mahnaz Staff, RN     Midline to right abd incision with gauze and tape, c/d/i. Skin flushed and patricia. Sacrum and heels intact. Oriented to room and call bell system. Bed low and locked with call bell in reach.

## 2021-02-23 NOTE — OP NOTES
[facility]  OPERATIVE REPORT    Name:  Mark Da Silva  MR#:  [id number]  :  1965  ACCOUNT #:  [de-identified]  DATE OF SERVICE:  2021    PREOPERATIVE DIAGNOSIS:  Left renal mass. POSTOPERATIVE DIAGNOSIS:  Left renal mass. PROCEDURE PERFORMED:  Left radical nephrectomy. SURGEON:  Verenice Borja. Katalina Camarena MD    ASSISTANT:  Alfred Ledesma MD    ANESTHESIA:  General.    COMPLICATIONS:  None. SPECIMENS REMOVED:  Left kidney and adrenal.    IMPLANTS:  None. ESTIMATED BLOOD LOSS:  250 mL. FINDINGS:  A large 10-cm left renal mass occupying the left superior kidney. DESCRIPTION OF PROCEDURE:  The patient was given a general anesthetic. Brown catheter was placed. She was positioned in the supine position. A rolled up sheet was placed under her left flank. The abdomen was prepped and draped in sterile fashion. An anterior subcostal incision was made on the left side about two fingerbreadths inferior to the left anterior costal margin. Subcutaneous tissue was divided with the Bovie. The musculature was divided with the Bovie. We opened the posterior sheath giving access to the peritoneal cavity. A Bookwalter retractor was used on the tissue edges for exposure. The colon on the left side was freed up by dividing the line of Toldt using the Bovie and also the LigaSure [place] device. The kidney could be palpated posterior to the colon with a mass occupying the left superior kidney. The colon was reflected medially. We were able to develop a plane between the Gerota's fascia and the mesocolon. I dissected inferiorly and freed up the kidney, so that the posterior body wall could be visualized. We then used the Bovie and the harmonic to divide the attachments occupying the left inferior kidney. The gonadal vein and ureter were divided and we dissected medially on the kidney from inferior to superior. I was able to identify the vein.   The artery could be palpated behind the vein. The vein was dissected. We divided the left adrenal vein between 2-0 silk ligatures. After this, we were able to identify the artery. It was encircled with a right angle clamp and a 0 silk suture placed at its takeoff from the aorta. Another silk suture was placed there as well, another one distally. We then divided the artery between the ligatures with good hemostasis. The vein was divided in similar fashion. At this point, we freed up the upper pole mass. This was difficult and it appeared to be adherent to the pancreas. Part of the adrenal was left with a specimen. We dissected from inferior to superior. I was eventually able to remove the entire kidney and the mass. The spleen and pancreas were inspected. No obvious bleeding was noted. At this point, sponge count was correct. We closed the musculature using two separate running layers of double-stranded #1 PDS. Staples were used on the skin. She was taken to the recovery room in stable condition.         MD AFRICA [email or web address]  D:  02/23/2021 13:39  T:  02/23/2021 14:35  JOB #:  [id number]

## 2021-02-23 NOTE — BRIEF OP NOTE
Brief Postoperative Note    Patient: Karolee Bosworth Crummie  YOB: 1965  MRN: [id number]    Date of Procedure: 2/23/2021     Pre-Op Diagnosis: Kidney mass [N28.89]    Post-Op Diagnosis: Same as preoperative diagnosis.       Procedure(s):  ERAS/ LEFT NEPHRECTOMY RADICAL WITH REMOVAL OF PARTIAL ADRENAL GLAND    Surgeon(s):  MD Heaven Beach MD    Surgical Assistant: None    Anesthesia: General     Estimated Blood Loss (mL): 617     Complications: None    Specimens:   ID Type Source Tests Collected by Time Destination   1 : left kidney and partial adrenal gland Fresh Kidney, Left  Natalio Caballero MD 2/23/2021 1256 Pathology        Implants: * No implants in log *    Drains: * No LDAs found *    Findings: see op note    Electronically Signed by Yuriy Cid MD on 2/23/2021 at 1:29 PM

## 2021-02-24 LAB
ANION GAP SERPL CALC-SCNC: 3 MMOL/L (ref 7–16)
BUN SERPL-MCNC: 18 MG/DL (ref 6–23)
CALCIUM SERPL-MCNC: 8.6 MG/DL (ref 8.3–10.4)
CHLORIDE SERPL-SCNC: 104 MMOL/L (ref 98–107)
CO2 SERPL-SCNC: 31 MMOL/L (ref 21–32)
CREAT SERPL-MCNC: 1.45 MG/DL (ref 0.6–1)
GLUCOSE SERPL-MCNC: 111 MG/DL (ref 65–100)
HCT VFR BLD AUTO: 45.3 % (ref 35.8–46.3)
HGB BLD-MCNC: 14.7 G/DL (ref 11.7–15.4)
MAGNESIUM SERPL-MCNC: 2.5 MG/DL (ref 1.8–2.4)
PHOSPHATE SERPL-MCNC: 2.8 MG/DL (ref 2.5–4.5)
POTASSIUM SERPL-SCNC: 4.5 MMOL/L (ref 3.5–5.1)
SODIUM SERPL-SCNC: 138 MMOL/L (ref 136–145)

## 2021-02-24 PROCEDURE — 74011000258 HC RX REV CODE- 258: Performed by: UROLOGY

## 2021-02-24 PROCEDURE — 83735 ASSAY OF MAGNESIUM: CPT

## 2021-02-24 PROCEDURE — 74011250637 HC RX REV CODE- 250/637: Performed by: UROLOGY

## 2021-02-24 PROCEDURE — 85014 HEMATOCRIT: CPT

## 2021-02-24 PROCEDURE — 36415 COLL VENOUS BLD VENIPUNCTURE: CPT

## 2021-02-24 PROCEDURE — 80048 BASIC METABOLIC PNL TOTAL CA: CPT

## 2021-02-24 PROCEDURE — 74011250636 HC RX REV CODE- 250/636: Performed by: UROLOGY

## 2021-02-24 PROCEDURE — 65270000029 HC RM PRIVATE

## 2021-02-24 PROCEDURE — 84100 ASSAY OF PHOSPHORUS: CPT

## 2021-02-24 PROCEDURE — 99024 POSTOP FOLLOW-UP VISIT: CPT | Performed by: NURSE PRACTITIONER

## 2021-02-24 RX ORDER — CALCIUM CARBONATE 200(500)MG
400 TABLET,CHEWABLE ORAL
Status: DISCONTINUED | OUTPATIENT
Start: 2021-02-24 | End: 2021-02-27 | Stop reason: HOSPADM

## 2021-02-24 RX ADMIN — ACETAMINOPHEN 1000 MG: 500 TABLET ORAL at 23:16

## 2021-02-24 RX ADMIN — GABAPENTIN 300 MG: 300 CAPSULE ORAL at 22:05

## 2021-02-24 RX ADMIN — ACETAMINOPHEN 1000 MG: 500 TABLET ORAL at 17:38

## 2021-02-24 RX ADMIN — LISINOPRIL 10 MG: 5 TABLET ORAL at 22:05

## 2021-02-24 RX ADMIN — ONDANSETRON 4 MG: 8 TABLET, ORALLY DISINTEGRATING ORAL at 14:38

## 2021-02-24 RX ADMIN — BUPROPION HYDROCHLORIDE 150 MG: 150 TABLET, EXTENDED RELEASE ORAL at 08:35

## 2021-02-24 RX ADMIN — ACETAMINOPHEN 1000 MG: 500 TABLET ORAL at 06:01

## 2021-02-24 RX ADMIN — CEFAZOLIN 1 G: 1 INJECTION, POWDER, FOR SOLUTION INTRAMUSCULAR; INTRAVENOUS at 10:04

## 2021-02-24 RX ADMIN — NALOXEGOL OXALATE 25 MG: 25 TABLET, FILM COATED ORAL at 06:17

## 2021-02-24 RX ADMIN — HYDROMORPHONE HYDROCHLORIDE 1 MG: 1 INJECTION, SOLUTION INTRAMUSCULAR; INTRAVENOUS; SUBCUTANEOUS at 13:12

## 2021-02-24 RX ADMIN — OXYCODONE 5 MG: 5 TABLET ORAL at 20:17

## 2021-02-24 RX ADMIN — ACETAMINOPHEN 1000 MG: 500 TABLET ORAL at 12:00

## 2021-02-24 RX ADMIN — CEFAZOLIN 1 G: 1 INJECTION, POWDER, FOR SOLUTION INTRAMUSCULAR; INTRAVENOUS at 18:24

## 2021-02-24 RX ADMIN — SODIUM CHLORIDE, SODIUM LACTATE, POTASSIUM CHLORIDE, AND CALCIUM CHLORIDE 25 ML/HR: 600; 310; 30; 20 INJECTION, SOLUTION INTRAVENOUS at 13:17

## 2021-02-24 RX ADMIN — GABAPENTIN 300 MG: 300 CAPSULE ORAL at 08:35

## 2021-02-24 RX ADMIN — OXYCODONE 5 MG: 5 TABLET ORAL at 12:09

## 2021-02-24 RX ADMIN — CEFAZOLIN 1 G: 1 INJECTION, POWDER, FOR SOLUTION INTRAMUSCULAR; INTRAVENOUS at 02:57

## 2021-02-24 RX ADMIN — CALCIUM CARBONATE (ANTACID) CHEW TAB 500 MG 400 MG: 500 CHEW TAB at 18:24

## 2021-02-24 RX ADMIN — GABAPENTIN 300 MG: 300 CAPSULE ORAL at 17:38

## 2021-02-24 RX ADMIN — KETOROLAC TROMETHAMINE 15 MG: 30 INJECTION, SOLUTION INTRAMUSCULAR at 06:01

## 2021-02-24 RX ADMIN — OXYCODONE 5 MG: 5 TABLET ORAL at 03:13

## 2021-02-24 NOTE — PROGRESS NOTES
Pt reminded importance of IS and gaol of minimal 10X per hour. Pt needs encouragement to reach goal. Ambulated patient in room. Pt states she is not up to ambulating in hallways at this time. Helped Pt  UOOB to chair. Pt sitting up at this time watching TV. Pt had only 10% of her dinner and does not want anymore. Pt agreed to trying a tonya ensure Enlive. Will continue to monitor

## 2021-02-24 NOTE — PROGRESS NOTES
Hourly rounds completed, pt denies needs at this time. Pt doing well this shift. Sat up in chair for breakfast and lunch but was feeling sick and did not get out of bed for dinner and also not did not want to walk in the halls but did walk in the room, pt is voiding.

## 2021-02-24 NOTE — PROGRESS NOTES
Care Management Interventions  Mode of Transport at Discharge: Self  Transition of Care Consult (CM Consult): Discharge Planning  Discharge Durable Medical Equipment: No  Physical Therapy Consult: No  Occupational Therapy Consult: No  Speech Therapy Consult: No  Confirm Follow Up Transport: Self  Spray Resource Information Provided?: No  Discharge Location  Discharge Placement: Home    Chart screened by  for discharge planning. Patient will likely discharge home. PT/OT have not been consulted. CM will continue to follow patient during hospitalization for discharge planning and needs. No needs identified at this time. Please consult  if any new issues arise.

## 2021-02-24 NOTE — PROGRESS NOTES
Admit Date: 2/23/2021    Subjective:     Patience Oates is sitting in the chair. She is tolerating solid foods. She has not yet passed flatus. Abdomen soft. Brown in place. She is afebrile, VSS. She has ambulated in the room. Pain controlled. Using incentive spirometer. Objective:     Patient Vitals for the past 8 hrs:   BP Temp Pulse Resp SpO2 Weight   02/24/21 0809 125/73 98.6 °F (37 °C) 85 18 100 %    02/24/21 0616      193 lb 11.2 oz (87.9 kg)   02/24/21 0446 126/72 98.2 °F (36.8 °C) 93 18 95 %      02/24 0701 - 02/24 1900  In: 240 [P.O.:240]  Out: -   02/22 1901 - 02/24 0700  In: 2261 [P. O.:690;  I.V.:3882]  Out: 1750 [Urine:1250]    Physical Exam:  GENERAL: alert, cooperative, no distress  LUNG: clear to auscultation bilaterally  HEART: regular rate and rhythm, S1, S2   ABDOMEN: soft, non-tender, dressing c/d/i  NEUROLOGIC: AOx3    Data Review   Recent Results (from the past 24 hour(s))   HGB & HCT    Collection Time: 02/23/21  2:36 PM   Result Value Ref Range    HGB 14.8 11.7 - 15.4 g/dL    HCT 46.5 (H) 35.8 - 19.2 %   METABOLIC PANEL, BASIC    Collection Time: 02/24/21  6:23 AM   Result Value Ref Range    Sodium 138 136 - 145 mmol/L    Potassium 4.5 3.5 - 5.1 mmol/L    Chloride 104 98 - 107 mmol/L    CO2 31 21 - 32 mmol/L    Anion gap 3 (L) 7 - 16 mmol/L    Glucose 111 (H) 65 - 100 mg/dL    BUN 18 6 - 23 MG/DL    Creatinine 1.45 (H) 0.6 - 1.0 MG/DL    GFR est AA 48 (L) >60 ml/min/1.73m2    GFR est non-AA 40 (L) >60 ml/min/1.73m2    Calcium 8.6 8.3 - 10.4 MG/DL   MAGNESIUM    Collection Time: 02/24/21  6:23 AM   Result Value Ref Range    Magnesium 2.5 (H) 1.8 - 2.4 mg/dL   PHOSPHORUS    Collection Time: 02/24/21  6:23 AM   Result Value Ref Range    Phosphorus 2.8 2.5 - 4.5 MG/DL   HGB & HCT    Collection Time: 02/24/21  6:23 AM   Result Value Ref Range    HGB 14.7 11.7 - 15.4 g/dL    HCT 45.3 35.8 - 46.3 %       Assessment:     Active Problems:    S/p nephrectomy (2/23/2021)      POD 1:    POSTOPERATIVE DIAGNOSIS:  Left kidney mass.     PROCEDURE PERFORMED:  Left radical open nephrectomy with partial removal of adrenal gland on the left. Hgb 14.7  Cn 1.45  Afebrile, VSS    Plan:     Remove ivera. Monitor voiding. Stop toradol/celebrex. Continue regular diet. Ambulate. Continue IS use.     BMP tomorrow AM.         Nicholas Clements NP  [facility]

## 2021-02-24 NOTE — PROGRESS NOTES
Hourly rounds performed;all pt needs met. PRN pain medication administered x2 for PRN abd pain beyond scheduled medication. Pt reports pain much better this am. Pt alert and oriented this shift; states she will get UOOB to chair once breakfast arrives, but would like to rest in bed until then. Pt did get UOOB to recliner for 4 hours before bed last night. Pt is doing IS this am independently. Daily weight recorded per standing scale. Pt denies pain or further needs at this time. Bed in low position and call light/ personal items within reach. Will continue to monitor and give bedside shift report to [facility] day shift nurse. Date 02/23/21 0700 - 02/24/21 0659 02/24/21 0700 - 02/25/21 0659   Shift 2259-4681 3573-1403 24 Hour Total 6407-9391 7700-4315 24 Hour Total   INTAKE   P.O.  690 690        P. O.  690 690      I. V.(mL/kg/hr) 5101(1.0) 1262 3882        I.V.  1262 1262        Volume (lactated Ringers infusion) 700  700        Volume (lactated Ringers infusion) 1900  1900        Volume (ceFAZolin (ANCEF) 2 g/20 mL in sterile water IV syringe) 20  20      Shift Total(mL/kg) 8371(70.0) 8399(80.0) 3050(65)      OUTPUT   Urine(mL/kg/hr) 250(0.2) 1000 1250        Urine Output 250  250        Urine Output (mL) (Urinary Catheter 02/23/21 2- way)  1000 1000      Blood 500  500        Estimated Blood Loss 500  500      Shift Total(mL/kg) 750(8.6) 1000(11.4) 1750(19.9)      NET 2223 727 9743      Weight (kg) 86.9 87.9 87.9 87.9 87.9 87.9

## 2021-02-25 LAB
ANION GAP SERPL CALC-SCNC: 2 MMOL/L (ref 7–16)
BUN SERPL-MCNC: 17 MG/DL (ref 6–23)
CALCIUM SERPL-MCNC: 8.7 MG/DL (ref 8.3–10.4)
CHLORIDE SERPL-SCNC: 105 MMOL/L (ref 98–107)
CO2 SERPL-SCNC: 32 MMOL/L (ref 21–32)
CREAT SERPL-MCNC: 1.23 MG/DL (ref 0.6–1)
GLUCOSE SERPL-MCNC: 84 MG/DL (ref 65–100)
POTASSIUM SERPL-SCNC: 4.3 MMOL/L (ref 3.5–5.1)
SODIUM SERPL-SCNC: 139 MMOL/L (ref 136–145)

## 2021-02-25 PROCEDURE — 74011250637 HC RX REV CODE- 250/637: Performed by: UROLOGY

## 2021-02-25 PROCEDURE — 74011000250 HC RX REV CODE- 250: Performed by: NURSE PRACTITIONER

## 2021-02-25 PROCEDURE — 2709999900 HC NON-CHARGEABLE SUPPLY

## 2021-02-25 PROCEDURE — 74011250636 HC RX REV CODE- 250/636: Performed by: UROLOGY

## 2021-02-25 PROCEDURE — 80048 BASIC METABOLIC PNL TOTAL CA: CPT

## 2021-02-25 PROCEDURE — 36415 COLL VENOUS BLD VENIPUNCTURE: CPT

## 2021-02-25 PROCEDURE — 99024 POSTOP FOLLOW-UP VISIT: CPT | Performed by: NURSE PRACTITIONER

## 2021-02-25 PROCEDURE — 74011250637 HC RX REV CODE- 250/637: Performed by: NURSE PRACTITIONER

## 2021-02-25 PROCEDURE — 74011000258 HC RX REV CODE- 258: Performed by: UROLOGY

## 2021-02-25 PROCEDURE — 65270000029 HC RM PRIVATE

## 2021-02-25 PROCEDURE — 74011250636 HC RX REV CODE- 250/636: Performed by: NURSE PRACTITIONER

## 2021-02-25 RX ORDER — FACIAL-BODY WIPES
10 EACH TOPICAL ONCE
Status: COMPLETED | OUTPATIENT
Start: 2021-02-25 | End: 2021-02-25

## 2021-02-25 RX ORDER — FACIAL-BODY WIPES
10 EACH TOPICAL DAILY PRN
Status: DISCONTINUED | OUTPATIENT
Start: 2021-02-25 | End: 2021-02-27 | Stop reason: HOSPADM

## 2021-02-25 RX ADMIN — CEFAZOLIN 1 G: 1 INJECTION, POWDER, FOR SOLUTION INTRAMUSCULAR; INTRAVENOUS at 02:46

## 2021-02-25 RX ADMIN — ACETAMINOPHEN 1000 MG: 500 TABLET ORAL at 17:33

## 2021-02-25 RX ADMIN — ACETAMINOPHEN 1000 MG: 500 TABLET ORAL at 11:37

## 2021-02-25 RX ADMIN — GABAPENTIN 300 MG: 300 CAPSULE ORAL at 17:02

## 2021-02-25 RX ADMIN — NALOXEGOL OXALATE 25 MG: 25 TABLET, FILM COATED ORAL at 09:23

## 2021-02-25 RX ADMIN — GABAPENTIN 300 MG: 300 CAPSULE ORAL at 21:04

## 2021-02-25 RX ADMIN — GABAPENTIN 300 MG: 300 CAPSULE ORAL at 09:20

## 2021-02-25 RX ADMIN — ACETAMINOPHEN 1000 MG: 500 TABLET ORAL at 05:21

## 2021-02-25 RX ADMIN — FAMOTIDINE 20 MG: 10 INJECTION INTRAVENOUS at 21:03

## 2021-02-25 RX ADMIN — CALCIUM CARBONATE (ANTACID) CHEW TAB 500 MG 400 MG: 500 CHEW TAB at 09:20

## 2021-02-25 RX ADMIN — FAMOTIDINE 20 MG: 10 INJECTION INTRAVENOUS at 09:21

## 2021-02-25 RX ADMIN — CALCIUM CARBONATE (ANTACID) CHEW TAB 500 MG 400 MG: 500 CHEW TAB at 11:37

## 2021-02-25 RX ADMIN — OXYCODONE 5 MG: 5 TABLET ORAL at 05:21

## 2021-02-25 RX ADMIN — SODIUM CHLORIDE, SODIUM LACTATE, POTASSIUM CHLORIDE, AND CALCIUM CHLORIDE 25 ML/HR: 600; 310; 30; 20 INJECTION, SOLUTION INTRAVENOUS at 06:15

## 2021-02-25 RX ADMIN — OXYCODONE 5 MG: 5 TABLET ORAL at 17:02

## 2021-02-25 RX ADMIN — BISACODYL 10 MG: 10 SUPPOSITORY RECTAL at 09:57

## 2021-02-25 RX ADMIN — BUPROPION HYDROCHLORIDE 150 MG: 150 TABLET, EXTENDED RELEASE ORAL at 09:20

## 2021-02-25 RX ADMIN — LISINOPRIL 10 MG: 5 TABLET ORAL at 21:04

## 2021-02-25 NOTE — PROGRESS NOTES
Admit Date: 2/23/2021    Subjective:     Alyson Oates is sitting in the chair. Reports indigestion. No relief with tums. She is tolerating solid foods, denies nausea/vomiting. She has not passed flatus. Pt is voiding. She is afebrile, VSS. Cn improving. Objective:     Patient Vitals for the past 8 hrs:   BP Temp Pulse Resp SpO2 Weight   02/25/21 0706 113/72 98.2 °F (36.8 °C) 73 16 95 %    02/25/21 0617      195 lb 8 oz (88.7 kg)   02/25/21 0448      191 lb 11.2 oz (87 kg)   02/25/21 0444 124/71 98.4 °F (36.9 °C) 84 16 96 %      No intake/output data recorded. 02/23 1901 - 02/25 0700  In: 2672 [P.O.:1410; I.V.:1262]  Out: 1003 [Urine:1003]    Physical Exam:  GENERAL: alert, cooperative, no distress  LUNG: clear to auscultation bilaterally  HEART: regular rate and rhythm, S1, S2   ABDOMEN: soft, non-tender, staples intact, slightly distended  NEUROLOGIC: AOx3    Data Review   Recent Results (from the past 24 hour(s))   METABOLIC PANEL, BASIC    Collection Time: 02/25/21  5:19 AM   Result Value Ref Range    Sodium 139 136 - 145 mmol/L    Potassium 4.3 3.5 - 5.1 mmol/L    Chloride 105 98 - 107 mmol/L    CO2 32 21 - 32 mmol/L    Anion gap 2 (L) 7 - 16 mmol/L    Glucose 84 65 - 100 mg/dL    BUN 17 6 - 23 MG/DL    Creatinine 1.23 (H) 0.6 - 1.0 MG/DL    GFR est AA 58 (L) >60 ml/min/1.73m2    GFR est non-AA 48 (L) >60 ml/min/1.73m2    Calcium 8.7 8.3 - 10.4 MG/DL       Assessment:     Active Problems:    S/p nephrectomy (2/23/2021)      POD 2:     POSTOPERATIVE DIAGNOSIS:  Left kidney mass.     PROCEDURE PERFORMED:  Left radical open nephrectomy with partial removal of adrenal gland on the left.          Cn 1.23 (from 1.45)   Afebrile, VSS    Plan:     Give dulcolax suppository. Start IV pepcid q 12 hours. Continue regular diet. Continue to ambulate. Continue incentive spirometer.       Sis Lara NP  [facility]

## 2021-02-25 NOTE — PROGRESS NOTES
Pt has not passed gas this shift. Ambulated halls x 2. Pt states she \"feels full\" and has expressed a feeling of \"heartburn\". Hourly and PRN rounds performed during this shift; all needs met at this time. Bed in low/locked position and call light, personal items within reach.

## 2021-02-26 PROCEDURE — 74011000250 HC RX REV CODE- 250: Performed by: NURSE PRACTITIONER

## 2021-02-26 PROCEDURE — 74011250636 HC RX REV CODE- 250/636: Performed by: NURSE PRACTITIONER

## 2021-02-26 PROCEDURE — 74011250637 HC RX REV CODE- 250/637: Performed by: UROLOGY

## 2021-02-26 PROCEDURE — 74011250637 HC RX REV CODE- 250/637: Performed by: NURSE PRACTITIONER

## 2021-02-26 PROCEDURE — 65270000029 HC RM PRIVATE

## 2021-02-26 PROCEDURE — 99024 POSTOP FOLLOW-UP VISIT: CPT | Performed by: NURSE PRACTITIONER

## 2021-02-26 RX ORDER — FACIAL-BODY WIPES
10 EACH TOPICAL ONCE
Status: COMPLETED | OUTPATIENT
Start: 2021-02-26 | End: 2021-02-26

## 2021-02-26 RX ADMIN — FAMOTIDINE 20 MG: 10 INJECTION INTRAVENOUS at 21:06

## 2021-02-26 RX ADMIN — GABAPENTIN 300 MG: 300 CAPSULE ORAL at 17:46

## 2021-02-26 RX ADMIN — OXYCODONE 5 MG: 5 TABLET ORAL at 13:29

## 2021-02-26 RX ADMIN — NALOXEGOL OXALATE 25 MG: 25 TABLET, FILM COATED ORAL at 06:15

## 2021-02-26 RX ADMIN — ACETAMINOPHEN 1000 MG: 500 TABLET ORAL at 11:47

## 2021-02-26 RX ADMIN — OXYCODONE 5 MG: 5 TABLET ORAL at 05:18

## 2021-02-26 RX ADMIN — GABAPENTIN 300 MG: 300 CAPSULE ORAL at 21:09

## 2021-02-26 RX ADMIN — BUPROPION HYDROCHLORIDE 150 MG: 150 TABLET, EXTENDED RELEASE ORAL at 08:25

## 2021-02-26 RX ADMIN — BISACODYL 10 MG: 10 SUPPOSITORY RECTAL at 09:37

## 2021-02-26 RX ADMIN — ACETAMINOPHEN 1000 MG: 500 TABLET ORAL at 00:06

## 2021-02-26 RX ADMIN — CALCIUM CARBONATE (ANTACID) CHEW TAB 500 MG 400 MG: 500 CHEW TAB at 11:47

## 2021-02-26 RX ADMIN — CALCIUM CARBONATE (ANTACID) CHEW TAB 500 MG 400 MG: 500 CHEW TAB at 08:25

## 2021-02-26 RX ADMIN — CALCIUM CARBONATE (ANTACID) CHEW TAB 500 MG 400 MG: 500 CHEW TAB at 17:47

## 2021-02-26 RX ADMIN — LISINOPRIL 10 MG: 5 TABLET ORAL at 21:10

## 2021-02-26 RX ADMIN — ACETAMINOPHEN 1000 MG: 500 TABLET ORAL at 17:46

## 2021-02-26 RX ADMIN — GABAPENTIN 300 MG: 300 CAPSULE ORAL at 08:25

## 2021-02-26 RX ADMIN — FAMOTIDINE 20 MG: 10 INJECTION INTRAVENOUS at 08:25

## 2021-02-26 RX ADMIN — ACETAMINOPHEN 1000 MG: 500 TABLET ORAL at 06:14

## 2021-02-26 NOTE — PROGRESS NOTES
Pt had liquid brown BM after dulcolax. Still no flatus since surgery, abdomen distended and pt uncomfortable. Pt denies nausea. She will stay another night and continue to ambulate and we will continue to monitor.

## 2021-02-26 NOTE — PROGRESS NOTES
initial visit, met with patient at bedside, offered support, assurance of spiritual care staff's prayers.     Kirill Alonzolain LORENAN

## 2021-02-26 NOTE — PROGRESS NOTES
Problem: Falls - Risk of  Goal: *Absence of Falls  Description: Document Green Salvia Fall Risk and appropriate interventions in the flowsheet.   Outcome: Progressing Towards Goal  Note: Fall Risk Interventions:            Medication Interventions: Evaluate medications/consider consulting pharmacy, Teach patient to arise slowly    Elimination Interventions: Call light in reach, Patient to call for help with toileting needs              Problem: Patient Education: Go to Patient Education Activity  Goal: Patient/Family Education  Outcome: Progressing Towards Goal

## 2021-02-26 NOTE — PROGRESS NOTES
Admit Date: 2/23/2021    Subjective:     Tiesha Oates reports she has still not passed flatus. Had dulcolax suppository yesterday x 2. She had liquid BM. Abdomen is soft. Objective:     Patient Vitals for the past 8 hrs:   BP Temp Pulse Resp SpO2 Weight   02/26/21 0734 112/73 98.7 °F (37.1 °C) 94 18 94 %    02/26/21 0515 106/74 98.7 °F (37.1 °C) 96 18 94 %    02/26/21 0400      192 lb 12.8 oz (87.5 kg)     No intake/output data recorded. 02/24 1901 - 02/26 0700  In: 676.1 [P.O.:480; I.V.:196.1]  Out: -     Physical Exam:  GENERAL: alert, cooperative, no distress  LUNG: clear to auscultation bilaterally  HEART: regular rate and rhythm, S1, S2   ABDOMEN: soft, non-tender, staples c/d/i  NEUROLOGIC: AOx3    Data Review No results found for this or any previous visit (from the past 24 hour(s)). Assessment:     Active Problems:    S/p nephrectomy (2/23/2021)      POD 3:     POSTOPERATIVE DIAGNOSIS:  Left kidney mass.     PROCEDURE PERFORMED:  Left radical open nephrectomy with partial removal of adrenal gland on the left.          Afebrile, VSS    Plan:     Give another dulcolax suppository. Ambulate pt. Continue regular diet. Stop IVF. Home later if passing flatus and progressing. RTO for follow up appt in 7-10 days for staple removal with NP, will get this scheduled. Ronnie Martin NP  [facility]  I have reviewed the above note and examined the patient. I agree with the exam, assessment and plan. Having BM's but not active flatus. Abdomen soft, non tender.    Melchor Mathew., MD

## 2021-02-26 NOTE — PROGRESS NOTES
Problem: Falls - Risk of  Goal: *Absence of Falls  Description: Document Eliseo Mills Fall Risk and appropriate interventions in the flowsheet.   Outcome: Progressing Towards Goal  Note: Fall Risk Interventions:            Medication Interventions: Evaluate medications/consider consulting pharmacy, Patient to call before getting OOB, Teach patient to arise slowly    Elimination Interventions: Call light in reach, Patient to call for help with toileting needs              Problem: Patient Education: Go to Patient Education Activity  Goal: Patient/Family Education  Outcome: Progressing Towards Goal

## 2021-02-26 NOTE — PROGRESS NOTES
Chart screened by  for discharge planning. Patient to possibly discharge this afternoon pending progress. CM will continue to follow patient during hospitalization for discharge planning and needs. No needs identified at this time. Please consult  if any new issues arise.

## 2021-02-26 NOTE — PROGRESS NOTES
Hourly rounds completed this shift. Pain managed per MAR. Pt reports 2 small BM's but still not able to pass gas. All needs met at this time. Bed low/locked. Call light within reach. Will continue to monitor and give bedside report to oncoming nurse.

## 2021-02-27 VITALS
TEMPERATURE: 98.1 F | OXYGEN SATURATION: 96 % | DIASTOLIC BLOOD PRESSURE: 72 MMHG | HEART RATE: 98 BPM | RESPIRATION RATE: 18 BRPM | SYSTOLIC BLOOD PRESSURE: 101 MMHG | WEIGHT: 190 LBS | HEIGHT: 63 IN | BODY MASS INDEX: 33.66 KG/M2

## 2021-02-27 PROCEDURE — 74011250637 HC RX REV CODE- 250/637: Performed by: UROLOGY

## 2021-02-27 PROCEDURE — 74011000250 HC RX REV CODE- 250: Performed by: NURSE PRACTITIONER

## 2021-02-27 PROCEDURE — 99024 POSTOP FOLLOW-UP VISIT: CPT | Performed by: UROLOGY

## 2021-02-27 PROCEDURE — 74011250636 HC RX REV CODE- 250/636: Performed by: NURSE PRACTITIONER

## 2021-02-27 RX ORDER — HYDROCODONE BITARTRATE AND ACETAMINOPHEN 5; 325 MG/1; MG/1
1 TABLET ORAL
Qty: 15 TAB | Refills: 0 | Status: SHIPPED | OUTPATIENT
Start: 2021-02-27 | End: 2021-03-02

## 2021-02-27 RX ORDER — DOCUSATE SODIUM 100 MG/1
100 CAPSULE, LIQUID FILLED ORAL 2 TIMES DAILY
Qty: 60 CAP | Refills: 2 | Status: SHIPPED | OUTPATIENT
Start: 2021-02-27 | End: 2021-05-28

## 2021-02-27 RX ADMIN — BUPROPION HYDROCHLORIDE 150 MG: 150 TABLET, EXTENDED RELEASE ORAL at 08:06

## 2021-02-27 RX ADMIN — OXYCODONE 5 MG: 5 TABLET ORAL at 03:52

## 2021-02-27 RX ADMIN — OXYCODONE 5 MG: 5 TABLET ORAL at 10:16

## 2021-02-27 RX ADMIN — CALCIUM CARBONATE (ANTACID) CHEW TAB 500 MG 400 MG: 500 CHEW TAB at 08:06

## 2021-02-27 RX ADMIN — FAMOTIDINE 20 MG: 10 INJECTION INTRAVENOUS at 08:07

## 2021-02-27 RX ADMIN — ACETAMINOPHEN 1000 MG: 500 TABLET ORAL at 06:33

## 2021-02-27 RX ADMIN — GABAPENTIN 300 MG: 300 CAPSULE ORAL at 08:06

## 2021-02-27 RX ADMIN — ACETAMINOPHEN 1000 MG: 500 TABLET ORAL at 00:19

## 2021-02-27 RX ADMIN — NALOXEGOL OXALATE 25 MG: 25 TABLET, FILM COATED ORAL at 08:10

## 2021-02-27 NOTE — PROGRESS NOTES
CM contacted patient via phone in room, to discuss discharge needs. Patient denies any needs at this time. Patient to discharge home this day. Spouse will transport the patient home. Please consult or notify CM if any needs shall arise. CM remains available.     Care Management Interventions  Mode of Transport at Discharge: Self(Patient's Spouse Fran Villatoro )  Transition of Care Consult (CM Consult): Discharge Planning  Discharge Durable Medical Equipment: No  Physical Therapy Consult: No  Occupational Therapy Consult: No  Speech Therapy Consult: No  Confirm Follow Up Transport: Self   Resource Information Provided?: No  Discharge Location  Discharge Placement: Home

## 2021-02-27 NOTE — DISCHARGE INSTRUCTIONS
Patient Education        Open Nephrectomy: What to Expect at Home  Your Recovery  A nephrectomy is surgery to take out part or all of the kidney. One or both kidneys may have been taken out. Sometimes other tissue near the kidney is taken out at the same time. The doctor took out your kidney through a long cut, called an incision, in the front or side of your belly. The incision will leave a scar that will fade with time. Your belly will feel sore after the surgery. This usually lasts about 1 to 2 weeks. Your doctor will give you pain medicine for this. You may also have other symptoms such as nausea, diarrhea, constipation, gas, or a headache. At first, you may have low energy and get tired quickly. It may take 3 to 6 months for your energy to fully return. Your body can work fine with one healthy kidney. If both kidneys are removed or your remaining kidney is not healthy, your doctor will talk to you about the kind of treatment you will need after surgery. This care sheet gives you a general idea about how long it will take for you to recover. But each person recovers at a different pace. Follow the steps below to get better as quickly as possible. How can you care for yourself at home? Activity    · Rest when you feel tired. Getting enough sleep will help you recover.     · Try to walk each day. Start by walking a little more than you did the day before. Bit by bit, increase the amount you walk. Walking boosts blood flow and helps prevent pneumonia and constipation.     · Avoid exercises that use your belly muscles and strenuous activities, such as bicycle riding, jogging, weight lifting, or aerobic exercise, until your doctor says it is okay.     · For at least 4 weeks, avoid lifting anything that would make you strain.  This may include a child, heavy grocery bags and milk containers, a heavy briefcase or backpack, cat litter or dog food bags, or a vacuum .     · Hold a pillow over the cut the doctor made (incision) when you cough or take deep breaths. This will support your belly and decrease your pain.     · Do breathing exercises at home as instructed by your doctor. This will help prevent pneumonia.     · Ask your doctor when you can drive again.     · You will probably need to take 4 to 6 weeks off from work. It depends on the type of work you do and how you feel.     · You may be able to take showers (unless you have a drainage tube near your incision). If you have a drainage tube, follow your doctor's instructions to empty and care for it. Do not take a bath for the first 2 weeks, or until your doctor tells you it is okay.     · Ask your doctor when it is okay for you to have sex. Diet    · You can eat your normal diet. If you were on a special diet for your kidneys before surgery, follow that diet until your doctor tells you to stop.     · If your stomach is upset, try bland, low-fat foods like plain rice, broiled chicken, toast, and yogurt.     · Drink plenty of fluids (unless your doctor tells you not to).     · You may notice that your bowel movements are not regular right after your surgery. This is common. Try to avoid constipation and straining with bowel movements. You may want to take a fiber supplement every day. If you have not had a bowel movement after a couple of days, ask your doctor about taking a mild laxative. Medicines    · Your doctor will tell you if and when you can restart your medicines. He or she will also give you instructions about taking any new medicines.     · If you take aspirin or some other blood thinner, ask your doctor if and when to start taking it again. Make sure that you understand exactly what your doctor wants you to do.     · Take pain medicines exactly as directed. ? If the doctor gave you a prescription medicine for pain, take it as prescribed.   ? If you are not taking a prescription pain medicine, take an over-the-counter medicine that your doctor recommends. Read and follow all instructions on the label. ? Do not take aspirin, ibuprofen (Advil, Motrin), naproxen (Aleve), or other nonsteroidal anti-inflammatory drugs (NSAIDs) unless your doctor says it is okay.     · If you think your pain medicine is making you sick to your stomach:  ? Take your medicine after meals (unless your doctor has told you not to). ? Ask your doctor for a different pain medicine.     · If your doctor prescribed antibiotics, take them as directed. Do not stop taking them just because you feel better. You need to take the full course of antibiotics. Incision care    · If you have strips of tape on the incision, leave the tape on for a week or until it falls off.     · Wash the area daily with warm, soapy water and pat it dry. Don't use hydrogen peroxide or alcohol, which can slow healing. You may cover the area with a gauze bandage if it weeps or rubs against clothing. Change the bandage every day.     · Keep the area clean and dry. Follow-up care is a key part of your treatment and safety. Be sure to make and go to all appointments, and call your doctor if you are having problems. It's also a good idea to know your test results and keep a list of the medicines you take. When should you call for help? Call 911 anytime you think you may need emergency care. For example, call if:    · You passed out (lost consciousness).     · You have chest pain, are short of breath, or cough up blood. Call your doctor now or seek immediate medical care if:    · You have pain that does not get better after you take your pain medicine.     · You have symptoms of a urinary tract infection. These may include:  ? Pain or burning when you urinate. ? A frequent need to urinate without being able to pass much urine. ? Pain in the flank, which is just below the rib cage and above the waist on either side of the back. ? Blood in the urine.   ? A fever.     · You have signs of infection, such as:  ? Increased pain, swelling, warmth, or redness. ? Red streaks leading from the incisions. ? Pus draining from the incisions. ? A fever.     · You have loose stitches, or your incisions come open.     · You are bleeding from the incisions.     · You cannot urinate.     · You are sick to your stomach or cannot drink fluids.     · You have signs of a blood clot in your leg (called a deep vein thrombosis), such as:  ? Pain in the calf, back of the knee, thigh, or groin. ? Redness and swelling in your leg. Watch closely for changes in your health, and be sure to contact your doctor if you are having any problems. Where can you learn more? Go to http://www.gray.com/  Enter C537 in the search box to learn more about \"Open Nephrectomy: What to Expect at Home. \"  Current as of: April 15, 2020               Content Version: 12.6  © 8957-6600 [employer]. Care instructions adapted under license by [employer] (which disclaims liability or warranty for this information). If you have questions about a medical condition or this instruction, always ask your healthcare professional. [place] [address] any warranty or liability for your use of this information.

## 2021-02-27 NOTE — PROGRESS NOTES
Hourly rounds completed. Pt verbalized passing gas multiple times overnight. Ambulated in the room/ bathroom. Complained of pain once to incision site to abdomen, medicated per MAR. No other needs presented, call light within reached, bed on low locked position. Will give report to oncoming RN.

## 2021-02-27 NOTE — PROGRESS NOTES
Doing well. Passing flatus. Jo reg diet. Ambulating. Voiding. AF.  VSS  Abd soft, NT, ND. Inc c/d/i. No new labs  Path [id number] RCC with neg margins  S/P L open rad nephrectomy POD#4  Home today.   RTO in 10 days with Dr. Em Cobos for staple removal.

## 2021-02-27 NOTE — PROGRESS NOTES
Discharge instructions reviewed with pt and spouse. IV removed and instructions on cleaning incision site given, pt voiced understanding. Paper scripts handed to pt at time of discharge including narcotic.

## 2021-03-16 NOTE — DISCHARGE SUMMARY
Discharge Summary     Patient: Zi Carranza MRN: [id number]  SSN: [id number]    YOB: 1965  Age: 54 y.o. Sex: female      Allergies: Codeine and Sulfa (sulfonamide antibiotics)    Admit Date: 2/23/2021    Discharge Date: 3/16/2021     * Admission Diagnoses:  Kidney mass [N28.89]  S/p nephrectomy [Z90.5]     * Discharge Diagnoses:   Hospital Problems as of 2/27/2021 Date Reviewed: 2/8/2021          Codes Class Noted - Resolved POA    S/p nephrectomy ICD-10-CM: Z90.5  ICD-9-CM: V45.73  2/23/2021 - Present Unknown               * Procedures for this admission:   Procedure(s):  ERAS/ LEFT NEPHRECTOMY RADICAL WITH REMOVAL OF PARTIAL ADRENAL GLAND      * Disposition: Home    * Discharged Condition: improved    * Hospital Course:      Ms. Yinka Gutiérrez is a 53 yo female with hx of left kidney mass and is s/p left radical nephrectomy with partial removal of adrenal gland on the left on 2/23/21 by Dr. Iram Ron. Brown removed on POD 1. She voided. She tolerated food but was slow to pass flatus. Eventually she did and felt much better. She had BM. She ambulated. She will d/c home and RTO in 10 days with Dr. Iram Ron for staple removal.      Patient Active Problem List   Diagnosis Code    [facility] E[facility]    S/p nephrectomy Z90.5             Discharge Medications:   Discharge Medication List as of 2/27/2021  9:54 AM      START taking these medications    Details   HYDROcodone-acetaminophen (NORCO) 5-325 mg per tablet Take 1 Tab by mouth every four (4) hours as needed for Pain for up to 3 days. Max Daily Amount: 6 Tabs., Print, Disp-15 Tab, R-0      docusate sodium (COLACE) 100 mg capsule Take 1 Cap by mouth two (2) times a day for 90 days. , Print, Disp-60 Cap, R-2         CONTINUE these medications which have NOT CHANGED    Details   loratadine (Claritin) 10 mg tablet Take 10 mg by mouth as needed for Allergies. , Historical Med      fluticasone propionate (FLONASE NA) by Nasal route as needed., Historical Med      acetaminophen (TYLENOL ARTHRITIS PO) Take 2 Tabs by mouth as needed., Historical Med      meloxicam (MOBIC) 15 mg tablet Take 15 mg by mouth nightly. Hold 5 days for procedure per anesthesia protocol., Historical Med      buPROPion XL (WELLBUTRIN XL) 150 mg tablet Take 1 Tab by mouth every morning., Normal, Disp-30 Tab,R-5      estrogens, conjugated,-methylTESTOSTERone (ESTRATEST) 1.25-2.5 mg per tablet Take 2 Tabs by mouth daily. Max Daily Amount: 2 Tabs., Print, Disp-60 Tab,R-5      lisinopril (PRINIVIL, ZESTRIL) 10 mg tablet Take  by mouth nightly., Historical Med              * Follow-up Care/Patient Instructions:   Activity: Activity as tolerated  Diet: Regular Diet  Wound Care: None needed    Follow-up Information     Follow up With Specialties Details Why Contact Info    Sajan Ridley MD Family Medicine   [address] [place] [address]  [phone]      Irina Melendez MD Urology Schedule an appointment as soon as possible for a visit in 10 days for staple removal Roxana   [address]  [phone]

## 2021-08-05 ENCOUNTER — HOSPITAL ENCOUNTER (OUTPATIENT)
Dept: CT IMAGING | Age: 56
Discharge: HOME | End: 2021-08-05
Attending: UROLOGY
Payer: COMMERCIAL

## 2021-08-05 DIAGNOSIS — N28.89 LEFT RENAL MASS: ICD-10-CM

## 2021-08-05 LAB — CREAT BLD-MCNC: 1.36 MG/DL (ref 0.8–1.5)

## 2021-08-05 PROCEDURE — 74011000258 HC RX REV CODE- 258: Performed by: UROLOGY

## 2021-08-05 PROCEDURE — 82565 ASSAY OF CREATININE: CPT

## 2021-08-05 PROCEDURE — 74011000636 HC RX REV CODE- 636: Performed by: UROLOGY

## 2021-08-05 PROCEDURE — 74178 CT ABD&PLV WO CNTR FLWD CNTR: CPT

## 2021-08-05 RX ORDER — SODIUM CHLORIDE 0.9 % (FLUSH) 0.9 %
10 SYRINGE (ML) INJECTION
Status: COMPLETED | OUTPATIENT
Start: 2021-08-05 | End: 2021-08-05

## 2021-08-05 RX ADMIN — SODIUM CHLORIDE 100 ML: 900 INJECTION, SOLUTION INTRAVENOUS at 11:04

## 2021-08-05 RX ADMIN — Medication 10 ML: at 11:04

## 2021-08-05 RX ADMIN — IOPAMIDOL 100 ML: 755 INJECTION, SOLUTION INTRAVENOUS at 11:04

## 2022-02-09 ENCOUNTER — HOSPITAL ENCOUNTER (OUTPATIENT)
Dept: CT IMAGING | Age: 57
Discharge: HOME | End: 2022-02-09
Attending: UROLOGY
Payer: COMMERCIAL

## 2022-02-09 DIAGNOSIS — N28.89 LEFT RENAL MASS: ICD-10-CM

## 2022-02-09 LAB — CREAT BLD-MCNC: 1.22 MG/DL (ref 0.8–1.5)

## 2022-02-09 PROCEDURE — 74011000636 HC RX REV CODE- 636: Performed by: UROLOGY

## 2022-02-09 PROCEDURE — 74011000258 HC RX REV CODE- 258: Performed by: UROLOGY

## 2022-02-09 PROCEDURE — 82565 ASSAY OF CREATININE: CPT

## 2022-02-09 PROCEDURE — 74178 CT ABD&PLV WO CNTR FLWD CNTR: CPT

## 2022-02-09 RX ORDER — SODIUM CHLORIDE 0.9 % (FLUSH) 0.9 %
10 SYRINGE (ML) INJECTION
Status: ACTIVE | OUTPATIENT
Start: 2022-02-09 | End: 2022-02-09

## 2022-02-09 RX ADMIN — SODIUM CHLORIDE 100 ML: 9 INJECTION, SOLUTION INTRAVENOUS at 11:17

## 2022-02-09 RX ADMIN — IOPAMIDOL 100 ML: 755 INJECTION, SOLUTION INTRAVENOUS at 11:17

## 2022-03-19 PROBLEM — Z90.5 S/P NEPHRECTOMY: Status: ACTIVE | Noted: 2021-02-23

## 2022-03-20 PROBLEM — E66.01 SEVERE OBESITY (HCC): Status: ACTIVE | Noted: 2019-05-13

## 2022-06-01 ENCOUNTER — TELEPHONE (OUTPATIENT)
Dept: OBGYN CLINIC | Age: 57
End: 2022-06-01

## 2022-06-01 RX ORDER — FLUCONAZOLE 150 MG/1
TABLET ORAL
Qty: 2 TABLET | Refills: 0 | Status: SHIPPED | OUTPATIENT
Start: 2022-06-01 | End: 2022-10-14

## 2022-06-01 NOTE — TELEPHONE ENCOUNTER
Pt calling with request for Diflucan. States that she has been trying to treat a yeast inf with OTCs but it doesn't seemed to have completely resolved. She is going out of town this weekend and would like to take Rx to clear it.

## 2022-06-01 NOTE — TELEPHONE ENCOUNTER
PT called back - Pt notified of below results.  Pt verbalized understanding     Rx sent. Pt informed and advised to schedule appt if this did not resolve her symptoms.

## 2022-09-22 DIAGNOSIS — Z79.890 HORMONE REPLACEMENT THERAPY (HRT): Primary | ICD-10-CM

## 2022-09-22 RX ORDER — ESTERIFIED ESTROGEN AND METHYLTESTOSTERONE .625; 1.25 MG/1; MG/1
1 TABLET ORAL DAILY
Qty: 30 TABLET | Refills: 1 | Status: SHIPPED | OUTPATIENT
Start: 2022-09-22 | End: 2022-10-14 | Stop reason: SDUPTHER

## 2022-09-22 NOTE — TELEPHONE ENCOUNTER
Pt called requesting a refill on her Estratest.  WWE scheduled for 10/14/22. Prescription routed to [facility] for cosignature. Pt notified rx will be sent in today to get her by until Mike. Duran Johnson. Pt v/u.

## 2022-10-14 ENCOUNTER — OFFICE VISIT (OUTPATIENT)
Dept: OBGYN CLINIC | Age: 57
End: 2022-10-14
Payer: COMMERCIAL

## 2022-10-14 VITALS
SYSTOLIC BLOOD PRESSURE: 114 MMHG | WEIGHT: 190 LBS | BODY MASS INDEX: 33.66 KG/M2 | HEIGHT: 63 IN | DIASTOLIC BLOOD PRESSURE: 70 MMHG

## 2022-10-14 DIAGNOSIS — N76.1 SUBACUTE VAGINITIS: ICD-10-CM

## 2022-10-14 DIAGNOSIS — Z12.72 SCREENING FOR VAGINAL CANCER: ICD-10-CM

## 2022-10-14 DIAGNOSIS — Z12.31 ENCOUNTER FOR SCREENING MAMMOGRAM FOR BREAST CANCER: ICD-10-CM

## 2022-10-14 DIAGNOSIS — Z79.890 HORMONE REPLACEMENT THERAPY (HRT): ICD-10-CM

## 2022-10-14 DIAGNOSIS — Z01.419 WELL WOMAN EXAM: Primary | ICD-10-CM

## 2022-10-14 DIAGNOSIS — Z13.89 SCREENING FOR GENITOURINARY CONDITION: ICD-10-CM

## 2022-10-14 DIAGNOSIS — N89.8 VAGINAL DISCHARGE: ICD-10-CM

## 2022-10-14 DIAGNOSIS — Z11.51 SCREENING FOR HUMAN PAPILLOMAVIRUS (HPV): ICD-10-CM

## 2022-10-14 LAB
BILIRUBIN, URINE, POC: NEGATIVE
BLOOD URINE, POC: NORMAL
GLUCOSE URINE, POC: NEGATIVE
KETONES, URINE, POC: NEGATIVE
LEUKOCYTE ESTERASE, URINE, POC: NORMAL
NITRITE, URINE, POC: NEGATIVE
PH, URINE, POC: 6 (ref 4.6–8)
PROTEIN,URINE, POC: NEGATIVE
SPECIFIC GRAVITY, URINE, POC: 1.01 (ref 1–1.03)
URINALYSIS CLARITY, POC: CLEAR
URINALYSIS COLOR, POC: YELLOW
UROBILINOGEN, POC: NORMAL

## 2022-10-14 PROCEDURE — 99396 PREV VISIT EST AGE 40-64: CPT | Performed by: NURSE PRACTITIONER

## 2022-10-14 PROCEDURE — 81003 URINALYSIS AUTO W/O SCOPE: CPT | Performed by: NURSE PRACTITIONER

## 2022-10-14 RX ORDER — CLONAZEPAM 1 MG/1
0.5 TABLET ORAL 2 TIMES DAILY PRN
COMMUNITY

## 2022-10-14 RX ORDER — MELOXICAM 15 MG/1
15 TABLET ORAL DAILY
COMMUNITY

## 2022-10-14 RX ORDER — ESTERIFIED ESTROGEN AND METHYLTESTOSTERONE .625; 1.25 MG/1; MG/1
1 TABLET ORAL DAILY
Qty: 30 TABLET | Refills: 5 | Status: SHIPPED | OUTPATIENT
Start: 2022-10-14

## 2022-10-14 NOTE — PROGRESS NOTES
Patient presents today for a routine gynecological examination with complaints of mucus like vaginal discharge that is brown and yellow. At time will have an odor. Sometimes worse when she is more active. Not having sex. She notices this discharge daily but it very light. She has to wear a panty liner daily. Denies itching. No irritation or burning. Denies urination issues. No hx cervical cancer or dysplasia. S/p hyst with BSO.    +hx kidney cancer, had nephrectomy. She is cancer free and Fu with urology routinely. She has several CT a year for continued FU. She tells me her  has cirrhosis and liver cancer exacerbated by his alcohol consumption. She is concerned his condition will lead to his passing within the next year. Using estratest, we had decreased dosage over the past couple visits. We disc using this agent for the shortest duration to help sx and may not necessarily need them anymore. We disc the risks associated with continuing estrogen products to include inc risk breast cancer, vte, stroke, cardiac event. She is aware of these risks and states her quality of life is much improved on the medication at this point. We discussed smoking cessation once again this year. We will revisit this next year, disc would strongly advise weaning off estratest next year. Last pap smear: 2019 Negative, HPV Negative. Hx of hysterectomy. Mammogram: 2020 Benign (BD2). Colonoscopy:  normal.   Dexa: NA    Hx of kidney removal due to kidney cancer. Sees urology once a year for ct scan.  has liver cancer. OB History          2    Para   1    Term   1            AB   1    Living   1         SAB   1    IAB        Ectopic        Molar        Multiple        Live Births   1                GYN History           No LMP recorded (lmp unknown). Patient has had a hysterectomy.      Past Medical History:  Past Medical History:   Diagnosis Date    Arthritis generalized     Hearing loss, left     History of Chiari malformation     surgery at [address] X2 1/7/2011 and 12/14/2011    Hypertension     medication    Left renal mass        Past Surgical History:  Past Surgical History:   Procedure Laterality Date    BLADDER SUSPENSION      TVT for Stress Incontinence    CARPAL TUNNEL RELEASE Right     COLONOSCOPY  12/01/2020    polyps removed    OTHER SURGICAL HISTORY      Subtemporal decompression with mesh 12/2011    OTHER SURGICAL HISTORY      Subtemporal decompression 01/2011    TLH AND BSO (CERVIX REMOVED)         Allergies: Allergies   Allergen Reactions    Sulfa Antibiotics Other (See Comments)     SOB and Headaches    Codeine Nausea And Vomiting       Medication History:  Current Outpatient Medications   Medication Sig Dispense Refill    meloxicam (MOBIC) 15 MG tablet Take 15 mg by mouth daily      clonazePAM (KLONOPIN) 1 MG tablet Take 0.5 mg by mouth 2 times daily as needed. estrogens, conjugated,-methylTESTOSTERone (ESTRATEST HS) 0.625-1.25 MG per tablet Take 1 tablet by mouth daily. 30 tablet 5    escitalopram (LEXAPRO) 10 MG tablet Take 10 mg by mouth daily      lisinopril (PRINIVIL;ZESTRIL) 10 MG tablet Take by mouth      loratadine (CLARITIN) 10 MG tablet Take 10 mg by mouth as needed       No current facility-administered medications for this visit.        Social History:  Social History     Socioeconomic History    Marital status:      Spouse name: Not on file    Number of children: Not on file    Years of education: Not on file    Highest education level: Not on file   Occupational History    Not on file   Tobacco Use    Smoking status: Some Days     Packs/day: 0.50     Types: Cigarettes    Smokeless tobacco: Never   Substance and Sexual Activity    Alcohol use: Yes    Drug use: Never    Sexual activity: Yes     Partners: Male     Birth control/protection: Surgical   Other Topics Concern    Not on file   Social History Narrative    Not on file Social Determinants of Health     Financial Resource Strain: Not on file   Food Insecurity: Not on file   Transportation Needs: Not on file   Physical Activity: Not on file   Stress: Not on file   Social Connections: Not on file   Intimate Partner Violence: Not on file   Housing Stability: Not on file       Family History:  Family History   Adopted: Yes   Problem Relation Age of Onset    Heart Surgery Maternal Aunt     Heart Disease Maternal Aunt     Diabetes Maternal Aunt     No Known Problems Paternal Grandfather     No Known Problems Paternal Grandmother     Cancer Maternal Grandfather     Ovarian Cancer Mother         Biological Mother    Heart Disease Maternal Grandmother     Diabetes Maternal Grandmother     Dementia Maternal Grandmother     Rheum Arthritis Father         Biological Father    Cancer Mother         Bone Ca    Uterine Cancer Mother     Hypertension Maternal Grandmother        Review of Systems - General ROS: negative except for that discussed in HPI      ROS:  Feeling well. No dyspnea or chest pain on exertion. No abdominal pain, change in bowel habits, black or bloody stools. No urinary tract symptoms. No neurological complaints. Objective:   /70   Ht 5' 3\" (1.6 m)   Wt 190 lb (86.2 kg)   LMP  (LMP Unknown)   BMI 33.66 kg/m²   The patient appears well, alert, oriented x 3, in no distress. ENT normal.  Neck supple. No adenopathy or thyromegaly. Lungs:  clear, good air entry, no wheezes, rhonchi or rales. Heart:  S1 and S2 normal, no murmurs, regular rate and rhythm. Abdomen:  soft without tenderness, guarding, mass or organomegaly. Extremities show no edema, normal peripheral pulses. Neurological is normal, no focal findings.     BREAST EXAM: breasts appear normal, no suspicious masses, no skin or nipple changes or axillary nodes, risk and benefit of breast self-exam was discussed    PELVIC EXAM: VULVA: normal appearing vulva with no masses, tenderness or lesions, VAGINA: normal appearing vagina with normal color and discharge, no lesions, R side vaginal vault with small polypoid structure. Pap taken with careful attention at this spot. CERVIX: surgically absent, UTERUS: surgically absent, vaginal cuff well healed, ADNEXA: normal adnexa in size, nontender and no masses    Assessment/Plan:     1. Well woman exam    - AMB POC URINALYSIS DIP STICK AUTO W/O MICRO    2. Screening for genitourinary condition    - AMB POC URINALYSIS DIP STICK AUTO W/O MICRO    3. Vaginal discharge    - Nuswab Vaginitis (VG); Future  - Nuswab Vaginitis (VG)    4. Subacute vaginitis    - Nuswab Vaginitis (VG); Future  - Nuswab Vaginitis (VG)    5. Screening for vaginal cancer    - PAP IG, HPV Rfx HPV 16/18,45; Future  - PAP IG, HPV Rfx HPV 16/18,45    6. Screening for human papillomavirus (HPV)    - PAP IG, HPV Rfx HPV 16/18,45; Future  - PAP IG, HPV Rfx HPV 16/18,45    7. Encounter for screening mammogram for breast cancer    - [facility] SIDDHARTH DIGITAL SCREEN BILATERAL; Future  - [facility] SIDDHARTH DIGITAL SCREEN BILATERAL    8. Hormone replacement therapy (HRT)    - estrogens, conjugated,-methylTESTOSTERone (ESTRATEST HS) 0.625-1.25 MG per tablet; Take 1 tablet by mouth daily. Dispense: 30 tablet; Refill: 5     Disc small polypoid structure seen in vaginal vault, question if this area bleeds a little and causes brown spotting? Possible granulation tissue? Will check nuswab vag plus to r/o infection    mammogram  pap smear  return annually or prn    Supervising physician is Dr. Tripp Pena.     Nadia Pelaez, APRN - CNP

## 2022-10-16 LAB
A VAGINAE DNA VAG QL NAA+PROBE: NORMAL SCORE
BVAB2 DNA VAG QL NAA+PROBE: NORMAL SCORE
C ALBICANS DNA VAG QL NAA+PROBE: NEGATIVE
C GLABRATA DNA VAG QL NAA+PROBE: NEGATIVE
MEGA1 DNA VAG QL NAA+PROBE: NORMAL SCORE
SPECIMEN SOURCE: NORMAL
T VAGINALIS RRNA SPEC QL NAA+PROBE: NEGATIVE

## 2022-10-19 LAB
CYTOLOGIST CVX/VAG CYTO: NORMAL
CYTOLOGY CVX/VAG DOC THIN PREP: NORMAL
HPV APTIMA: NEGATIVE
Lab: NORMAL
PATH REPORT.FINAL DX SPEC: NORMAL
STAT OF ADQ CVX/VAG CYTO-IMP: NORMAL

## 2023-02-21 ENCOUNTER — TELEPHONE (OUTPATIENT)
Dept: UROLOGY | Age: 58
End: 2023-02-21

## 2023-02-21 DIAGNOSIS — C64.2 MALIGNANT NEOPLASM OF LEFT KIDNEY, EXCEPT RENAL PELVIS (HCC): Primary | ICD-10-CM

## 2023-03-17 ENCOUNTER — TELEPHONE (OUTPATIENT)
Dept: UROLOGY | Age: 58
End: 2023-03-17

## 2023-03-17 NOTE — TELEPHONE ENCOUNTER
PT called to RS  3/17/23 appt for Dr. Prashant Carmichael. Pt states she has not had the MRI done needed for the appt. RS pt to 7/7/23 pt is wanting another work in.

## 2023-03-24 ENCOUNTER — HOSPITAL ENCOUNTER (OUTPATIENT)
Dept: CT IMAGING | Age: 58
Discharge: HOME | End: 2023-03-24
Payer: COMMERCIAL

## 2023-03-24 DIAGNOSIS — C64.2 MALIGNANT NEOPLASM OF LEFT KIDNEY, EXCEPT RENAL PELVIS (HCC): ICD-10-CM

## 2023-03-24 LAB — CREAT BLD-MCNC: 1.17 MG/DL (ref 0.8–1.5)

## 2023-03-24 PROCEDURE — 82565 ASSAY OF CREATININE: CPT

## 2023-03-24 PROCEDURE — 74178 CT ABD&PLV WO CNTR FLWD CNTR: CPT

## 2023-03-24 PROCEDURE — 2580000003 HC RX 258: Performed by: UROLOGY

## 2023-03-24 PROCEDURE — 6360000004 HC RX CONTRAST MEDICATION: Performed by: UROLOGY

## 2023-03-24 RX ORDER — SODIUM CHLORIDE 0.9 % (FLUSH) 0.9 %
10 SYRINGE (ML) INJECTION
Status: COMPLETED | OUTPATIENT
Start: 2023-03-24 | End: 2023-03-24

## 2023-03-24 RX ORDER — 0.9 % SODIUM CHLORIDE 0.9 %
100 INTRAVENOUS SOLUTION INTRAVENOUS ONCE
Status: COMPLETED | OUTPATIENT
Start: 2023-03-24 | End: 2023-03-24

## 2023-03-24 RX ADMIN — SODIUM CHLORIDE 100 ML: 9 INJECTION, SOLUTION INTRAVENOUS at 10:03

## 2023-03-24 RX ADMIN — IOPAMIDOL 100 ML: 755 INJECTION, SOLUTION INTRAVENOUS at 10:03

## 2023-03-24 RX ADMIN — SODIUM CHLORIDE, PRESERVATIVE FREE 10 ML: 5 INJECTION INTRAVENOUS at 10:03

## 2023-04-17 ENCOUNTER — OFFICE VISIT (OUTPATIENT)
Dept: UROLOGY | Age: 58
End: 2023-04-17
Payer: COMMERCIAL

## 2023-04-17 DIAGNOSIS — R82.81 PYURIA: ICD-10-CM

## 2023-04-17 DIAGNOSIS — C64.2 MALIGNANT NEOPLASM OF LEFT KIDNEY, EXCEPT RENAL PELVIS (HCC): Primary | ICD-10-CM

## 2023-04-17 LAB
BILIRUBIN, URINE, POC: NEGATIVE
BLOOD URINE, POC: NORMAL
GLUCOSE URINE, POC: NEGATIVE
KETONES, URINE, POC: NEGATIVE
LEUKOCYTE ESTERASE, URINE, POC: NORMAL
NITRITE, URINE, POC: NEGATIVE
PH, URINE, POC: 6 (ref 4.6–8)
PROTEIN,URINE, POC: NEGATIVE
SPECIFIC GRAVITY, URINE, POC: 1.01 (ref 1–1.03)
URINALYSIS CLARITY, POC: NORMAL
URINALYSIS COLOR, POC: NORMAL
UROBILINOGEN, POC: NORMAL

## 2023-04-17 PROCEDURE — 81003 URINALYSIS AUTO W/O SCOPE: CPT | Performed by: UROLOGY

## 2023-04-17 PROCEDURE — 99213 OFFICE O/P EST LOW 20 MIN: CPT | Performed by: UROLOGY

## 2023-04-17 ASSESSMENT — ENCOUNTER SYMPTOMS
BACK PAIN: 0
NAUSEA: 0

## 2023-04-17 NOTE — PROGRESS NOTES
Additional Contrast? Radiologist Recommendation; Future    Pyuria  -     Culture, Urine; Future  -     Culture, Urine     TRICIA  Asymptomatic pyuria, will send culture. Follow-up and Dispositions    Return in about 1 year (around 4/17/2024) for CT before.

## 2023-04-20 LAB
BACTERIA SPEC CULT: NORMAL
SERVICE CMNT-IMP: NORMAL

## 2023-04-27 ENCOUNTER — TELEPHONE (OUTPATIENT)
Dept: UROLOGY | Age: 58
End: 2023-04-27

## 2023-05-24 ENCOUNTER — TELEPHONE (OUTPATIENT)
Dept: UROLOGY | Age: 58
End: 2023-05-24

## 2023-05-24 ENCOUNTER — OFFICE VISIT (OUTPATIENT)
Dept: UROLOGY | Age: 58
End: 2023-05-24
Payer: COMMERCIAL

## 2023-05-24 DIAGNOSIS — R82.81 PYURIA: ICD-10-CM

## 2023-05-24 DIAGNOSIS — N28.89 OTHER SPECIFIED DISORDERS OF KIDNEY AND URETER: Primary | ICD-10-CM

## 2023-05-24 LAB
BILIRUBIN, URINE, POC: NEGATIVE
BLOOD URINE, POC: NORMAL
GLUCOSE URINE, POC: NEGATIVE
KETONES, URINE, POC: NEGATIVE
LEUKOCYTE ESTERASE, URINE, POC: NORMAL
NITRITE, URINE, POC: NEGATIVE
PH, URINE, POC: 5.5 (ref 4.6–8)
PROTEIN,URINE, POC: NEGATIVE
SPECIFIC GRAVITY, URINE, POC: 1.01 (ref 1–1.03)
URINALYSIS CLARITY, POC: NORMAL
URINALYSIS COLOR, POC: NORMAL
UROBILINOGEN, POC: NORMAL

## 2023-05-24 PROCEDURE — 81003 URINALYSIS AUTO W/O SCOPE: CPT | Performed by: NURSE PRACTITIONER

## 2023-05-27 LAB
BACTERIA SPEC CULT: NORMAL
BACTERIA SPEC CULT: NORMAL
SERVICE CMNT-IMP: NORMAL

## 2023-06-01 DIAGNOSIS — Z79.890 HORMONE REPLACEMENT THERAPY (HRT): ICD-10-CM

## 2023-06-01 RX ORDER — ESTERIFIED ESTROGEN AND METHYLTESTOSTERONE .625; 1.25 MG/1; MG/1
1 TABLET ORAL DAILY
Qty: 30 TABLET | Refills: 5 | Status: CANCELLED | OUTPATIENT
Start: 2023-06-01

## 2023-06-01 RX ORDER — ESTERIFIED ESTROGEN AND METHYLTESTOSTERONE .625; 1.25 MG/1; MG/1
1 TABLET ORAL DAILY
Qty: 30 TABLET | Refills: 0 | Status: SHIPPED | OUTPATIENT
Start: 2023-06-01

## 2023-07-06 DIAGNOSIS — Z79.890 HORMONE REPLACEMENT THERAPY (HRT): ICD-10-CM

## 2023-07-06 RX ORDER — ESTERIFIED ESTROGEN AND METHYLTESTOSTERONE .625; 1.25 MG/1; MG/1
1 TABLET ORAL DAILY
Qty: 30 TABLET | Refills: 3 | Status: SHIPPED | OUTPATIENT
Start: 2023-07-06

## 2023-07-26 NOTE — PROGRESS NOTES
Results for orders placed or performed in visit on 05/24/23   AMB POC URINALYSIS DIP STICK AUTO W/O MICRO   Result Value Ref Range    Color (UA POC)      Clarity (UA POC)      Glucose, Urine, POC Negative Negative    Bilirubin, Urine, POC Negative Negative    KETONES, Urine, POC Negative Negative    Specific Gravity, Urine, POC 1.010 1.001 - 1.035    Blood (UA POC) Trace-lysed Negative    pH, Urine, POC 5.5 4.6 - 8.0    Protein, Urine, POC Negative Negative    Urobilinogen, POC 0.2 mg/dL     Nitrite, Urine, POC Negative Negative    Leukocyte Esterase, Urine, POC Small Negative     Send culture. Will call results. Push fluids.    Jazmyn Fox, APRN - CNP 6

## 2024-06-04 NOTE — PROGRESS NOTES
thromboembolus from last year.  We discussed I do not think she is a good candidate for hormone replacement therapy due to this history.  Nonhormonal options for treating hot flashes were reviewed with patient to include over-the-counter supplements versus Veozah  She wishes to try Veozah for management of vasomotor symptoms.  Side effects reviewed at length with patient as well as risk of liver changes.  Discussed recommendation for checking LFTs while on Veozah therapy.  Check cmp today  Start veozah 45mg qd upon normal LFTs  Med recheck 3mo with repeat CMP    pap smear next year  return annually or prn    Supervising physician is Dr. Reyna.    Dolores Sears, APRN - CNP

## 2024-06-05 ENCOUNTER — OFFICE VISIT (OUTPATIENT)
Dept: OBGYN CLINIC | Age: 59
End: 2024-06-05
Payer: COMMERCIAL

## 2024-06-05 VITALS
SYSTOLIC BLOOD PRESSURE: 102 MMHG | DIASTOLIC BLOOD PRESSURE: 64 MMHG | WEIGHT: 191.1 LBS | BODY MASS INDEX: 33.86 KG/M2 | HEIGHT: 63 IN

## 2024-06-05 DIAGNOSIS — Z12.31 SCREENING MAMMOGRAM FOR BREAST CANCER: ICD-10-CM

## 2024-06-05 DIAGNOSIS — Z13.89 SCREENING FOR GENITOURINARY CONDITION: ICD-10-CM

## 2024-06-05 DIAGNOSIS — Z79.899 MEDICATION MANAGEMENT: ICD-10-CM

## 2024-06-05 DIAGNOSIS — Z86.718 HX OF THROMBOEMBOLISM: ICD-10-CM

## 2024-06-05 DIAGNOSIS — Z01.419 WELL WOMAN EXAM: Primary | ICD-10-CM

## 2024-06-05 DIAGNOSIS — N95.1 VASOMOTOR SYMPTOMS DUE TO MENOPAUSE: ICD-10-CM

## 2024-06-05 LAB
ALBUMIN SERPL-MCNC: 3.6 G/DL (ref 3.5–5)
ALBUMIN/GLOB SERPL: 1.3 (ref 1–1.9)
ALP SERPL-CCNC: 136 U/L (ref 35–104)
ALT SERPL-CCNC: 17 U/L (ref 12–65)
ANION GAP SERPL CALC-SCNC: 9 MMOL/L (ref 9–18)
AST SERPL-CCNC: 24 U/L (ref 15–37)
BILIRUB SERPL-MCNC: <0.2 MG/DL (ref 0–1.2)
BILIRUBIN, URINE, POC: NEGATIVE
BLOOD URINE, POC: NEGATIVE
BUN SERPL-MCNC: 18 MG/DL (ref 6–23)
CALCIUM SERPL-MCNC: 9.5 MG/DL (ref 8.8–10.2)
CHLORIDE SERPL-SCNC: 103 MMOL/L (ref 98–107)
CO2 SERPL-SCNC: 27 MMOL/L (ref 20–28)
CREAT SERPL-MCNC: 1.14 MG/DL (ref 0.6–1.1)
GLOBULIN SER CALC-MCNC: 2.8 G/DL (ref 2.3–3.5)
GLUCOSE SERPL-MCNC: 93 MG/DL (ref 70–99)
GLUCOSE URINE, POC: NEGATIVE
KETONES, URINE, POC: NEGATIVE
LEUKOCYTE ESTERASE, URINE, POC: NORMAL
NITRITE, URINE, POC: NEGATIVE
PH, URINE, POC: 6.5 (ref 4.6–8)
POTASSIUM SERPL-SCNC: 5.1 MMOL/L (ref 3.5–5.1)
PROT SERPL-MCNC: 6.4 G/DL (ref 6.3–8.2)
PROTEIN,URINE, POC: NEGATIVE
SODIUM SERPL-SCNC: 139 MMOL/L (ref 136–145)
SPECIFIC GRAVITY, URINE, POC: 1.01 (ref 1–1.03)
URINALYSIS CLARITY, POC: CLEAR
URINALYSIS COLOR, POC: YELLOW
UROBILINOGEN, POC: NORMAL

## 2024-06-05 PROCEDURE — 99396 PREV VISIT EST AGE 40-64: CPT | Performed by: NURSE PRACTITIONER

## 2024-06-05 PROCEDURE — 81002 URINALYSIS NONAUTO W/O SCOPE: CPT | Performed by: NURSE PRACTITIONER

## 2024-06-05 PROCEDURE — 99214 OFFICE O/P EST MOD 30 MIN: CPT | Performed by: NURSE PRACTITIONER

## 2024-06-05 SDOH — ECONOMIC STABILITY: FOOD INSECURITY: WITHIN THE PAST 12 MONTHS, YOU WORRIED THAT YOUR FOOD WOULD RUN OUT BEFORE YOU GOT MONEY TO BUY MORE.: NEVER TRUE

## 2024-06-05 SDOH — ECONOMIC STABILITY: FOOD INSECURITY: WITHIN THE PAST 12 MONTHS, THE FOOD YOU BOUGHT JUST DIDN'T LAST AND YOU DIDN'T HAVE MONEY TO GET MORE.: NEVER TRUE

## 2024-06-05 SDOH — ECONOMIC STABILITY: INCOME INSECURITY: HOW HARD IS IT FOR YOU TO PAY FOR THE VERY BASICS LIKE FOOD, HOUSING, MEDICAL CARE, AND HEATING?: NOT HARD AT ALL

## 2024-06-05 SDOH — ECONOMIC STABILITY: HOUSING INSECURITY
IN THE LAST 12 MONTHS, WAS THERE A TIME WHEN YOU DID NOT HAVE A STEADY PLACE TO SLEEP OR SLEPT IN A SHELTER (INCLUDING NOW)?: NO

## 2024-06-05 SDOH — ECONOMIC STABILITY: TRANSPORTATION INSECURITY
IN THE PAST 12 MONTHS, HAS LACK OF TRANSPORTATION KEPT YOU FROM MEETINGS, WORK, OR FROM GETTING THINGS NEEDED FOR DAILY LIVING?: NO

## 2024-06-10 DIAGNOSIS — Z79.890 HORMONE REPLACEMENT THERAPY (HRT): Primary | ICD-10-CM

## 2024-06-10 RX ORDER — FEZOLINETANT 45 MG/1
1 TABLET, FILM COATED ORAL DAILY
Qty: 90 TABLET | Refills: 0 | Status: SHIPPED | OUTPATIENT
Start: 2024-06-10

## 2024-07-01 ENCOUNTER — OFFICE VISIT (OUTPATIENT)
Dept: UROLOGY | Age: 59
End: 2024-07-01
Payer: COMMERCIAL

## 2024-07-01 DIAGNOSIS — N28.89 OTHER SPECIFIED DISORDERS OF KIDNEY AND URETER: Primary | ICD-10-CM

## 2024-07-01 DIAGNOSIS — Z90.5 SOLITARY KIDNEY, ACQUIRED: ICD-10-CM

## 2024-07-01 DIAGNOSIS — C64.2 MALIGNANT NEOPLASM OF LEFT KIDNEY, EXCEPT RENAL PELVIS (HCC): ICD-10-CM

## 2024-07-01 DIAGNOSIS — R82.81 PYURIA: ICD-10-CM

## 2024-07-01 PROCEDURE — 81003 URINALYSIS AUTO W/O SCOPE: CPT | Performed by: UROLOGY

## 2024-07-01 PROCEDURE — 99213 OFFICE O/P EST LOW 20 MIN: CPT | Performed by: UROLOGY

## 2024-07-01 ASSESSMENT — ENCOUNTER SYMPTOMS
BACK PAIN: 0
NAUSEA: 0

## 2024-07-01 NOTE — PROGRESS NOTES
Cancer Maternal Grandfather     Ovarian Cancer Mother         Biological Mother    Heart Disease Maternal Grandmother     Diabetes Maternal Grandmother     Dementia Maternal Grandmother     Rheum Arthritis Father         Biological Father    Cancer Mother         Bone Ca    Uterine Cancer Mother     Hypertension Maternal Grandmother        Review of Systems  Constitutional:   Negative for fever.  GI:  Negative for nausea.  Musculoskeletal:  Negative for back pain.      LMP  (LMP Unknown)     Urinalysis  UA - Dipstick  Results for orders placed or performed in visit on 07/01/24   AMB POC URINALYSIS DIP STICK AUTO W/O MICRO   Result Value Ref Range    Color (UA POC)      Clarity (UA POC)      Glucose, Urine, POC Negative     Bilirubin, Urine, POC Negative     KETONES, Urine, POC Negative     Specific Gravity, Urine, POC 1.010 1.001 - 1.035    Blood (UA POC) Trace-intact     pH, Urine, POC 5.5 4.6 - 8.0    Protein, Urine, POC Negative     Urobilinogen, POC 0.2 mg/dL     Nitrite, Urine, POC Negative     Leukocyte Esterase, Urine, POC Small    Results for orders placed or performed in visit on 11/24/20   AMB POC URINALYSIS DIP STICK AUTO W/O MICRO   Result Value Ref Range    Color (UA POC) Yellow     Clarity (UA POC) Clear     Glucose, Urine, POC Negative Negative    Bilirubin, Urine, POC Negative Negative    Ketones, Urine, POC Negative Negative    Specific Gravity, Urine, POC 1.010 1.001 - 1.035 NA    Blood (UA POC) Trace Negative    pH, Urine, POC 7.0 4.6 - 8.0 NA    Protein, Urine, POC Negative Negative    Urobilinogen, POC 0.2 mg/dL 0.2 - 1    Nitrite, Urine, POC Negative Negative    Leukocyte Esterase, Urine, POC 1+ Negative       UA - Micro  WBC - 0  RBC - 0  Bacteria - 0  Epith - 0    Physical Exam    Assessment and Plan    Ame was seen today for follow-up.    Diagnoses and all orders for this visit:    Other specified disorders of kidney and ureter  -     AMB POC URINALYSIS DIP STICK AUTO W/O

## 2025-06-02 ENCOUNTER — TELEPHONE (OUTPATIENT)
Dept: UROLOGY | Age: 60
End: 2025-06-02

## 2025-06-02 NOTE — TELEPHONE ENCOUNTER
Pt calling in stating that she had and MRI of her Brain on 4/3/25. MRI tech stated she may want to reach out to oncology due to history of cancer. Pt wanted Dr. Adames to look at the results and see if their is anything he would like her to do before she comes and sees him. Please see my chart message on 4/9/25.Pt please call pt back.

## 2025-06-11 ENCOUNTER — HOSPITAL ENCOUNTER (OUTPATIENT)
Dept: CT IMAGING | Age: 60
Discharge: HOME | End: 2025-06-13
Payer: COMMERCIAL

## 2025-06-11 DIAGNOSIS — C64.2 MALIGNANT NEOPLASM OF LEFT KIDNEY, EXCEPT RENAL PELVIS (HCC): ICD-10-CM

## 2025-06-11 PROCEDURE — 6360000004 HC RX CONTRAST MEDICATION: Performed by: UROLOGY

## 2025-06-11 PROCEDURE — 71270 CT THORAX DX C-/C+: CPT

## 2025-06-11 RX ORDER — IOPAMIDOL 755 MG/ML
100 INJECTION, SOLUTION INTRAVASCULAR
Status: COMPLETED | OUTPATIENT
Start: 2025-06-11 | End: 2025-06-11

## 2025-06-11 RX ADMIN — IOPAMIDOL 100 ML: 755 INJECTION, SOLUTION INTRAVENOUS at 10:46

## 2025-06-30 ENCOUNTER — OFFICE VISIT (OUTPATIENT)
Dept: UROLOGY | Age: 60
End: 2025-06-30
Payer: COMMERCIAL

## 2025-06-30 DIAGNOSIS — N28.89 OTHER SPECIFIED DISORDERS OF KIDNEY AND URETER: Primary | ICD-10-CM

## 2025-06-30 DIAGNOSIS — R82.81 PYURIA: ICD-10-CM

## 2025-06-30 DIAGNOSIS — M89.9 SKULL LESION: ICD-10-CM

## 2025-06-30 DIAGNOSIS — C64.2 MALIGNANT NEOPLASM OF LEFT KIDNEY, EXCEPT RENAL PELVIS (HCC): ICD-10-CM

## 2025-06-30 LAB
BILIRUBIN, URINE, POC: NEGATIVE
BLOOD URINE, POC: NORMAL
GLUCOSE URINE, POC: NEGATIVE MG/DL
KETONES, URINE, POC: NEGATIVE MG/DL
LEUKOCYTE ESTERASE, URINE, POC: NORMAL
NITRITE, URINE, POC: NEGATIVE
PH, URINE, POC: 5.5 (ref 4.6–8)
PROTEIN,URINE, POC: NEGATIVE MG/DL
SPECIFIC GRAVITY, URINE, POC: 1.02 (ref 1–1.03)
URINALYSIS CLARITY, POC: NORMAL
URINALYSIS COLOR, POC: NORMAL
UROBILINOGEN, POC: NORMAL MG/DL

## 2025-06-30 PROCEDURE — 81003 URINALYSIS AUTO W/O SCOPE: CPT | Performed by: UROLOGY

## 2025-06-30 PROCEDURE — 99213 OFFICE O/P EST LOW 20 MIN: CPT | Performed by: UROLOGY

## 2025-06-30 NOTE — PROGRESS NOTES
GRADE:   G1: Nucleoli absent or inconspicuous and basophilic at   400x magnification   TUMOR SIZE:   8.1 cm   TUMOR FOCALITY:   Unifocal   TUMOR EXTENSION:   Tumor limited to kidney   SARCOMATOID FEATURES:   Not identified   RHABDOID FEATURES:   Not identified   TUMOR NECROSIS:   Not identified   MARGINS        MARGINS:   Uninvolved by invasive carcinoma   LYMPH NODES        REGIONAL LYMPH NODES:   No lymph nodes submitted or found   PATHOLOGIC STAGE CLASSIFICATION (pTNM, AJCC 8th Edition)        PRIMARY TUMOR (pT):   pT2   REGIONAL LYMPH NODES (pN):   pNX   ADDITIONAL FINDINGS        PATHOLOGIC FINDINGS IN NONNEOPLASTIC KIDNEY:   None identified     Dr. Leo syed.        She has no complaints. Cr 1.23 at discharge on 2-25-21.   CT C/A/P 8-5-21: IMPRESSION  Post resection of the left kidney and left renal mass.  No evidence  of locally recurrent tumor or metastatic disease.     CT C/A/P 2-9-22:    IMPRESSION  1.  No evidence of local recurrence or distant metastasis in this patient post  left nephrectomy.  Cr 1.22 on 2-9-22.   CT C/A/P in 3-23:   No evidence of recurrent or metastatic disease in the chest, abdomen   or pelvis. Liver and right renal cysts are stable. Left nephrectomy changes.       Her  has been hospitalized with health problems.   Pt denies weight loss or dysuria.   She had a DVT in 8-23. Chest CT was negative for PE. She feels well now. No weight loss.     CT C/A/P on 6-11-25:   IMPRESSION:  Post left nephrectomy. No evidence of locally recurrent tumor or metastatic  disease in the chest, abdomen, or pelvis.    Had brain MRI at [facility] on 4-3-25 as follow up of her Chiari malformation: Impression      No acute intracranial abnormality.  Unchanged appearance of the craniocervical junction and posterior fossa status post Chiari I decompression.    There is an enhancing lesion in the left parietal bone that was not appreciably present on comparison examinations, as above.  Etiology is

## 2025-07-02 LAB
BACTERIA SPEC CULT: NORMAL
SERVICE CMNT-IMP: NORMAL

## 2025-07-11 ENCOUNTER — TELEPHONE (OUTPATIENT)
Dept: UROLOGY | Age: 60
End: 2025-07-11

## 2025-07-11 DIAGNOSIS — C64.2 MALIGNANT NEOPLASM OF LEFT KIDNEY, EXCEPT RENAL PELVIS (HCC): ICD-10-CM

## 2025-07-11 DIAGNOSIS — M89.9 SKULL LESION: Primary | ICD-10-CM

## 2025-07-11 NOTE — TELEPHONE ENCOUNTER
Presented case at urology cancer conference  Recommendation was for PSMA PET or conventional PET scan to work up the skull lesion seen on MRI at [facility].    Diagnosis Orders   1. Skull lesion  PET CT DETECTNET TUMOR IMAGE SKULL THIGH      2. Malignant neoplasm of left kidney, except renal pelvis (HCC)  PET CT DETECTNET TUMOR IMAGE SKULL THIGH

## 2025-07-22 ENCOUNTER — HOSPITAL ENCOUNTER (OUTPATIENT)
Dept: PET IMAGING | Age: 60
Discharge: HOME | End: 2025-07-25
Payer: COMMERCIAL

## 2025-07-22 VITALS — WEIGHT: 175 LBS | BODY MASS INDEX: 31.01 KG/M2 | HEIGHT: 63 IN

## 2025-07-22 DIAGNOSIS — C64.2 MALIGNANT NEOPLASM OF LEFT KIDNEY, EXCEPT RENAL PELVIS (HCC): ICD-10-CM

## 2025-07-22 DIAGNOSIS — M89.9 SKULL LESION: ICD-10-CM

## 2025-07-22 LAB
GLUCOSE BLD STRIP.AUTO-MCNC: 91 MG/DL (ref 65–100)
SERVICE CMNT-IMP: NORMAL

## 2025-07-22 PROCEDURE — 2500000003 HC RX 250 WO HCPCS: Performed by: UROLOGY

## 2025-07-22 PROCEDURE — 82962 GLUCOSE BLOOD TEST: CPT

## 2025-07-22 PROCEDURE — 78815 PET IMAGE W/CT SKULL-THIGH: CPT

## 2025-07-22 PROCEDURE — 6360000004 HC RX CONTRAST MEDICATION: Performed by: UROLOGY

## 2025-07-22 PROCEDURE — A9609 HC RX DIAGNOSTIC RADIOPHARMACEUTICAL: HCPCS | Performed by: UROLOGY

## 2025-07-22 PROCEDURE — 3430000000 HC RX DIAGNOSTIC RADIOPHARMACEUTICAL: Performed by: UROLOGY

## 2025-07-22 RX ORDER — SODIUM CHLORIDE 0.9 % (FLUSH) 0.9 %
20 SYRINGE (ML) INJECTION AS NEEDED
Status: DISCONTINUED | OUTPATIENT
Start: 2025-07-22 | End: 2025-07-26 | Stop reason: HOSPADM

## 2025-07-22 RX ORDER — DIATRIZOATE MEGLUMINE AND DIATRIZOATE SODIUM 660; 100 MG/ML; MG/ML
10 SOLUTION ORAL; RECTAL
Status: DISCONTINUED | OUTPATIENT
Start: 2025-07-22 | End: 2025-07-26 | Stop reason: HOSPADM

## 2025-07-22 RX ORDER — FLUDEOXYGLUCOSE F 18 200 MCI/ML
13.64 INJECTION, SOLUTION INTRAVENOUS
Status: COMPLETED | OUTPATIENT
Start: 2025-07-22 | End: 2025-07-22

## 2025-07-22 RX ADMIN — FLUDEOXYGLUCOSE F 18 13.64 MILLICURIE: 200 INJECTION, SOLUTION INTRAVENOUS at 12:00

## 2025-07-22 RX ADMIN — DIATRIZOATE MEGLUMINE AND DIATRIZOATE SODIUM 10 ML: 660; 100 LIQUID ORAL; RECTAL at 12:00

## 2025-07-22 RX ADMIN — SODIUM CHLORIDE, PRESERVATIVE FREE 20 ML: 5 INJECTION INTRAVENOUS at 12:00

## (undated) DEVICE — SUTURE PERMAHAND SZ 0 L30IN NONABSORBABLE BLK L30MM PSL 3/8 590H

## (undated) DEVICE — GOWN,REINF,POLY,ECL,PP SLV,XL: Brand: MEDLINE

## (undated) DEVICE — 2000CC GUARDIAN II: Brand: GUARDIAN

## (undated) DEVICE — PREP SKN CHLRAPRP APL 26ML STR --

## (undated) DEVICE — DRAPE,T,LAPARO,TRANS,STERILE: Brand: MEDLINE

## (undated) DEVICE — BLADE ELECTRODE: Brand: EDGE

## (undated) DEVICE — SUTURE PERMAHAND SZ 2-0 L30IN NONABSORBABLE BLK SILK W/O A305H

## (undated) DEVICE — DRAPE TWL SURG 16X26IN BLU ORB04] ALLCARE INC]

## (undated) DEVICE — SUTURE PERMAHAND SZ 0 L30IN NONABSORBABLE BLK SILK BRAID A306H

## (undated) DEVICE — MAJOR GENERAL: Brand: MEDLINE INDUSTRIES, INC.

## (undated) DEVICE — DRAPE,INSTRUMENT,MAGNETIC,10X16: Brand: MEDLINE

## (undated) DEVICE — SOLUTION IV 1000ML 0.9% SOD CHL

## (undated) DEVICE — ROYAL SILK SURGICAL GOWN, XL: Brand: CONVERTORS

## (undated) DEVICE — GARMENT,MEDLINE,DVT,INT,CALF,MED, GEN2: Brand: MEDLINE

## (undated) DEVICE — TRAY,URINE METER,100% SILICONE,16FR10ML: Brand: MEDLINE

## (undated) DEVICE — GAUZE,SPONGE,4"X4",16PLY,STRL,LF,10/TRAY: Brand: MEDLINE

## (undated) DEVICE — SPONGE LAP 18X18IN STRL -- 5/PK

## (undated) DEVICE — DEVICE SEAL L23CM NANO COAT MARYLAND JAW OPN DIV LIGASURE

## (undated) DEVICE — Device

## (undated) DEVICE — SUTURE PERMAHAND SZ 2-0 L30IN NONABSORBABLE BLK SH L26MM C016D

## (undated) DEVICE — REM POLYHESIVE ADULT PATIENT RETURN ELECTRODE: Brand: VALLEYLAB

## (undated) DEVICE — SUTURE PDS + SZ 1 L96IN ABSRB VLT L65MM TP-1 1/2 CIR PDP880G